# Patient Record
Sex: FEMALE | Race: WHITE | Employment: UNEMPLOYED | URBAN - METROPOLITAN AREA
[De-identification: names, ages, dates, MRNs, and addresses within clinical notes are randomized per-mention and may not be internally consistent; named-entity substitution may affect disease eponyms.]

---

## 2019-07-31 ENCOUNTER — HOSPITAL ENCOUNTER (OUTPATIENT)
Age: 39
Setting detail: SPECIMEN
Discharge: HOME | End: 2019-07-31
Payer: MEDICARE

## 2019-07-31 LAB
-: ABNORMAL
AMORPHOUS: ABNORMAL
BACTERIA: ABNORMAL
BILIRUBIN URINE: NEGATIVE
CASTS UA: ABNORMAL /LPF (ref 0–8)
COLOR: YELLOW
COMMENT UA: ABNORMAL
CRYSTALS, UA: ABNORMAL /HPF
EPITHELIAL CELLS UA: ABNORMAL /HPF (ref 0–5)
GLUCOSE URINE: NEGATIVE
KETONES, URINE: NEGATIVE
LEUKOCYTE ESTERASE, URINE: ABNORMAL
MUCUS: ABNORMAL
NITRITE, URINE: NEGATIVE
OTHER OBSERVATIONS UA: ABNORMAL
PH UA: 5.5 (ref 5–8)
PROTEIN UA: NEGATIVE
RBC UA: ABNORMAL /HPF (ref 0–4)
RENAL EPITHELIAL, UA: ABNORMAL /HPF
SPECIFIC GRAVITY UA: 1.02 (ref 1–1.03)
TRICHOMONAS: ABNORMAL
TURBIDITY: CLEAR
URINE HGB: ABNORMAL
UROBILINOGEN, URINE: NORMAL
WBC UA: ABNORMAL /HPF (ref 0–5)
YEAST: ABNORMAL

## 2019-08-01 LAB
FOLLICLE STIMULATING HORMONE: 7.1 U/L (ref 1.7–21.5)
LH: 4.6 U/L (ref 1–95.6)
TSH SERPL DL<=0.05 MIU/L-ACNC: 2.22 MIU/L (ref 0.3–5)

## 2019-08-02 LAB
CULTURE: ABNORMAL
Lab: ABNORMAL
SPECIMEN DESCRIPTION: ABNORMAL

## 2019-08-07 LAB
ESTRADIOL LEVEL: 38.3 PG/ML
ESTROGEN TOTAL: 87.9 PG/ML
ESTRONE: 49.6 PG/ML

## 2019-12-16 ENCOUNTER — HOSPITAL ENCOUNTER (OUTPATIENT)
Age: 39
Setting detail: SPECIMEN
Discharge: HOME | End: 2019-12-16
Payer: MEDICARE

## 2019-12-19 LAB
CULTURE: NORMAL
Lab: NORMAL
SPECIMEN DESCRIPTION: NORMAL

## 2019-12-31 ENCOUNTER — HOSPITAL ENCOUNTER (OUTPATIENT)
Age: 39
Setting detail: SPECIMEN
Discharge: HOME | End: 2019-12-31
Payer: MEDICARE

## 2019-12-31 LAB
ABSOLUTE EOS #: 0.13 K/UL (ref 0–0.44)
ABSOLUTE IMMATURE GRANULOCYTE: <0.03 K/UL (ref 0–0.3)
ABSOLUTE LYMPH #: 1.7 K/UL (ref 1.1–3.7)
ABSOLUTE MONO #: 0.42 K/UL (ref 0.1–1.2)
ALBUMIN SERPL-MCNC: 4.1 G/DL (ref 3.5–5.2)
ALBUMIN/GLOBULIN RATIO: 1.4 (ref 1–2.5)
ALP BLD-CCNC: 42 U/L (ref 35–104)
ALT SERPL-CCNC: 18 U/L (ref 5–33)
ANION GAP SERPL CALCULATED.3IONS-SCNC: 11 MMOL/L (ref 9–17)
AST SERPL-CCNC: 14 U/L
BASOPHILS # BLD: 1 % (ref 0–2)
BASOPHILS ABSOLUTE: 0.06 K/UL (ref 0–0.2)
BILIRUB SERPL-MCNC: 0.4 MG/DL (ref 0.3–1.2)
BUN BLDV-MCNC: 9 MG/DL (ref 6–20)
BUN/CREAT BLD: NORMAL (ref 9–20)
CALCIUM SERPL-MCNC: 9 MG/DL (ref 8.6–10.4)
CHLORIDE BLD-SCNC: 104 MMOL/L (ref 98–107)
CO2: 22 MMOL/L (ref 20–31)
CREAT SERPL-MCNC: 0.66 MG/DL (ref 0.5–0.9)
DIFFERENTIAL TYPE: NORMAL
EOSINOPHILS RELATIVE PERCENT: 3 % (ref 1–4)
GFR AFRICAN AMERICAN: >60 ML/MIN
GFR NON-AFRICAN AMERICAN: >60 ML/MIN
GFR SERPL CREATININE-BSD FRML MDRD: NORMAL ML/MIN/{1.73_M2}
GFR SERPL CREATININE-BSD FRML MDRD: NORMAL ML/MIN/{1.73_M2}
GLUCOSE BLD-MCNC: 84 MG/DL (ref 70–99)
HCT VFR BLD CALC: 42.8 % (ref 36.3–47.1)
HEMOGLOBIN: 13.6 G/DL (ref 11.9–15.1)
IMMATURE GRANULOCYTES: 0 %
LYMPHOCYTES # BLD: 34 % (ref 24–43)
MCH RBC QN AUTO: 28.4 PG (ref 25.2–33.5)
MCHC RBC AUTO-ENTMCNC: 31.8 G/DL (ref 28.4–34.8)
MCV RBC AUTO: 89.4 FL (ref 82.6–102.9)
MONOCYTES # BLD: 8 % (ref 3–12)
MONONUCLEOSIS SCREEN: NEGATIVE
NRBC AUTOMATED: 0 PER 100 WBC
PDW BLD-RTO: 12.5 % (ref 11.8–14.4)
PLATELET # BLD: 305 K/UL (ref 138–453)
PLATELET ESTIMATE: NORMAL
PMV BLD AUTO: 10.3 FL (ref 8.1–13.5)
POTASSIUM SERPL-SCNC: 4.3 MMOL/L (ref 3.7–5.3)
RBC # BLD: 4.79 M/UL (ref 3.95–5.11)
RBC # BLD: NORMAL 10*6/UL
SEG NEUTROPHILS: 54 % (ref 36–65)
SEGMENTED NEUTROPHILS ABSOLUTE COUNT: 2.74 K/UL (ref 1.5–8.1)
SODIUM BLD-SCNC: 137 MMOL/L (ref 135–144)
TOTAL PROTEIN: 7 G/DL (ref 6.4–8.3)
TSH SERPL DL<=0.05 MIU/L-ACNC: 1.67 MIU/L (ref 0.3–5)
WBC # BLD: 5.1 K/UL (ref 3.5–11.3)
WBC # BLD: NORMAL 10*3/UL

## 2020-01-03 LAB
EBV EARLY ANTIGEN IGG: 214 U/ML
EBV INTERPRETATION: ABNORMAL
EBV NUCLEAR AG AB: 760 U/ML
EPSTEIN-BARR VCA IGG: 1433 U/ML
EPSTEIN-BARR VCA IGM: 67 U/ML

## 2020-01-04 LAB
CMV DNA QUANTATATIVE INTERPRETATION: NOT DETECTED
CMV QUANT IU/ML: <227 IU/ML
CMV QUANT LOG IU/ML: <2.4 LOG IU/ML
CMV SOURCE: NORMAL
CMVQ COPY/ML: <390 CPY/ML
CYTOMEGALOVIRUS QUANT. PCR: <2.6 LOG CPY/ML

## 2020-07-27 ENCOUNTER — HOSPITAL ENCOUNTER (OUTPATIENT)
Age: 40
Setting detail: SPECIMEN
Discharge: HOME | End: 2020-07-27
Payer: MEDICARE

## 2020-07-28 LAB
CULTURE: NO GROWTH
Lab: NORMAL
SPECIMEN DESCRIPTION: NORMAL

## 2021-01-07 ENCOUNTER — HOSPITAL ENCOUNTER (OUTPATIENT)
Age: 41
Setting detail: SPECIMEN
Discharge: HOME | End: 2021-01-07
Payer: COMMERCIAL

## 2021-01-09 LAB
CULTURE: ABNORMAL
Lab: ABNORMAL
SPECIMEN DESCRIPTION: ABNORMAL

## 2021-01-13 ENCOUNTER — HOSPITAL ENCOUNTER (OUTPATIENT)
Age: 41
Setting detail: SPECIMEN
Discharge: HOME | End: 2021-01-13
Payer: COMMERCIAL

## 2021-01-13 LAB
ABSOLUTE EOS #: 0.21 K/UL (ref 0–0.44)
ABSOLUTE IMMATURE GRANULOCYTE: <0.03 K/UL (ref 0–0.3)
ABSOLUTE LYMPH #: 2.08 K/UL (ref 1.1–3.7)
ABSOLUTE MONO #: 0.64 K/UL (ref 0.1–1.2)
BASOPHILS # BLD: 1 % (ref 0–2)
BASOPHILS ABSOLUTE: 0.08 K/UL (ref 0–0.2)
DIFFERENTIAL TYPE: NORMAL
EOSINOPHILS RELATIVE PERCENT: 3 % (ref 1–4)
HCT VFR BLD CALC: 42.8 % (ref 36.3–47.1)
HEMOGLOBIN: 13.7 G/DL (ref 11.9–15.1)
IMMATURE GRANULOCYTES: 0 %
LYMPHOCYTES # BLD: 30 % (ref 24–43)
MCH RBC QN AUTO: 28.8 PG (ref 25.2–33.5)
MCHC RBC AUTO-ENTMCNC: 32 G/DL (ref 28.4–34.8)
MCV RBC AUTO: 89.9 FL (ref 82.6–102.9)
MONOCYTES # BLD: 9 % (ref 3–12)
NRBC AUTOMATED: 0 PER 100 WBC
PDW BLD-RTO: 12.8 % (ref 11.8–14.4)
PLATELET # BLD: 378 K/UL (ref 138–453)
PLATELET ESTIMATE: NORMAL
PMV BLD AUTO: 10.1 FL (ref 8.1–13.5)
RBC # BLD: 4.76 M/UL (ref 3.95–5.11)
RBC # BLD: NORMAL 10*6/UL
SEG NEUTROPHILS: 57 % (ref 36–65)
SEGMENTED NEUTROPHILS ABSOLUTE COUNT: 3.88 K/UL (ref 1.5–8.1)
WBC # BLD: 6.9 K/UL (ref 3.5–11.3)
WBC # BLD: NORMAL 10*3/UL

## 2021-01-14 LAB
ALBUMIN SERPL-MCNC: 4.3 G/DL (ref 3.5–5.2)
ALBUMIN/GLOBULIN RATIO: 1.5 (ref 1–2.5)
ALP BLD-CCNC: 50 U/L (ref 35–104)
ALT SERPL-CCNC: 19 U/L (ref 5–33)
ANION GAP SERPL CALCULATED.3IONS-SCNC: 7 MMOL/L (ref 9–17)
AST SERPL-CCNC: 16 U/L
BILIRUB SERPL-MCNC: 0.19 MG/DL (ref 0.3–1.2)
BUN BLDV-MCNC: 13 MG/DL (ref 6–20)
BUN/CREAT BLD: ABNORMAL (ref 9–20)
CALCIUM SERPL-MCNC: 9.9 MG/DL (ref 8.6–10.4)
CHLORIDE BLD-SCNC: 99 MMOL/L (ref 98–107)
CHOLESTEROL/HDL RATIO: 3.7
CHOLESTEROL: 191 MG/DL
CO2: 29 MMOL/L (ref 20–31)
CREAT SERPL-MCNC: 0.67 MG/DL (ref 0.5–0.9)
GFR AFRICAN AMERICAN: >60 ML/MIN
GFR NON-AFRICAN AMERICAN: >60 ML/MIN
GFR SERPL CREATININE-BSD FRML MDRD: ABNORMAL ML/MIN/{1.73_M2}
GFR SERPL CREATININE-BSD FRML MDRD: ABNORMAL ML/MIN/{1.73_M2}
GLUCOSE BLD-MCNC: 80 MG/DL (ref 70–99)
HDLC SERPL-MCNC: 51 MG/DL
LDL CHOLESTEROL: 119 MG/DL (ref 0–130)
POTASSIUM SERPL-SCNC: 4 MMOL/L (ref 3.7–5.3)
SODIUM BLD-SCNC: 135 MMOL/L (ref 135–144)
TOTAL PROTEIN: 7.2 G/DL (ref 6.4–8.3)
TRIGL SERPL-MCNC: 106 MG/DL
VITAMIN D 25-HYDROXY: 18.4 NG/ML (ref 30–100)
VLDLC SERPL CALC-MCNC: NORMAL MG/DL (ref 1–30)

## 2021-01-27 ENCOUNTER — HOSPITAL ENCOUNTER (OUTPATIENT)
Age: 41
Setting detail: SPECIMEN
Discharge: HOME | End: 2021-01-27
Payer: COMMERCIAL

## 2021-01-27 LAB — MONONUCLEOSIS SCREEN: NEGATIVE

## 2022-07-24 ENCOUNTER — HOSPITAL ENCOUNTER (INPATIENT)
Age: 42
LOS: 2 days | Discharge: HOME | DRG: 751 | End: 2022-07-26
Attending: STUDENT IN AN ORGANIZED HEALTH CARE EDUCATION/TRAINING PROGRAM | Admitting: PSYCHIATRY & NEUROLOGY
Payer: MEDICAID

## 2022-07-24 DIAGNOSIS — F32.A DEPRESSION WITH SUICIDAL IDEATION: ICD-10-CM

## 2022-07-24 DIAGNOSIS — R45.851 SUICIDAL IDEATION: Primary | ICD-10-CM

## 2022-07-24 DIAGNOSIS — R45.851 DEPRESSION WITH SUICIDAL IDEATION: ICD-10-CM

## 2022-07-24 PROBLEM — F33.2 MDD (MAJOR DEPRESSIVE DISORDER), RECURRENT SEVERE, WITHOUT PSYCHOSIS (HCC): Status: ACTIVE | Noted: 2022-07-24

## 2022-07-24 LAB
ALBUMIN SERPL-MCNC: 4.5 G/DL (ref 3.5–5.1)
ALP BLD-CCNC: 48 U/L (ref 38–126)
ALT SERPL-CCNC: 24 U/L (ref 11–66)
AMPHETAMINE+METHAMPHETAMINE URINE SCREEN: NEGATIVE
ANION GAP SERPL CALCULATED.3IONS-SCNC: 12 MEQ/L (ref 8–16)
AST SERPL-CCNC: 21 U/L (ref 5–40)
BACTERIA: ABNORMAL
BARBITURATE QUANTITATIVE URINE: NEGATIVE
BASOPHILS # BLD: 1 %
BASOPHILS ABSOLUTE: 0.1 THOU/MM3 (ref 0–0.1)
BENZODIAZEPINE QUANTITATIVE URINE: NEGATIVE
BILIRUB SERPL-MCNC: 0.6 MG/DL (ref 0.3–1.2)
BILIRUBIN DIRECT: < 0.2 MG/DL (ref 0–0.3)
BILIRUBIN URINE: NEGATIVE
BLOOD, URINE: NEGATIVE
BUN BLDV-MCNC: 13 MG/DL (ref 7–22)
CALCIUM SERPL-MCNC: 9.8 MG/DL (ref 8.5–10.5)
CANNABINOID QUANTITATIVE URINE: NEGATIVE
CASTS: ABNORMAL /LPF
CASTS: ABNORMAL /LPF
CHARACTER, URINE: ABNORMAL
CHLORIDE BLD-SCNC: 104 MEQ/L (ref 98–111)
CO2: 23 MEQ/L (ref 23–33)
COCAINE METABOLITE QUANTITATIVE URINE: NEGATIVE
COLOR: YELLOW
CREAT SERPL-MCNC: 0.8 MG/DL (ref 0.4–1.2)
CRYSTALS: ABNORMAL
EOSINOPHIL # BLD: 2.2 %
EOSINOPHILS ABSOLUTE: 0.2 THOU/MM3 (ref 0–0.4)
EPITHELIAL CELLS, UA: ABNORMAL /HPF
ERYTHROCYTE [DISTWIDTH] IN BLOOD BY AUTOMATED COUNT: 13 % (ref 11.5–14.5)
ERYTHROCYTE [DISTWIDTH] IN BLOOD BY AUTOMATED COUNT: 42.6 FL (ref 35–45)
GFR SERPL CREATININE-BSD FRML MDRD: 79 ML/MIN/1.73M2
GLUCOSE BLD-MCNC: 98 MG/DL (ref 70–108)
GLUCOSE, URINE: NEGATIVE MG/DL
HCT VFR BLD CALC: 44.5 % (ref 37–47)
HEMOGLOBIN: 14.7 GM/DL (ref 12–16)
IMMATURE GRANS (ABS): 0.03 THOU/MM3 (ref 0–0.07)
IMMATURE GRANULOCYTES: 0.4 %
KETONES, URINE: ABNORMAL
LACTIC ACID: 0.8 MMOL/L (ref 0.5–2)
LEUKOCYTE ESTERASE, URINE: ABNORMAL
LYMPHOCYTES # BLD: 25.5 %
LYMPHOCYTES ABSOLUTE: 1.8 THOU/MM3 (ref 1–4.8)
MCH RBC QN AUTO: 29.8 PG (ref 26–33)
MCHC RBC AUTO-ENTMCNC: 33 GM/DL (ref 32.2–35.5)
MCV RBC AUTO: 90.1 FL (ref 81–99)
MISCELLANEOUS LAB TEST RESULT: ABNORMAL
MONOCYTES # BLD: 7.6 %
MONOCYTES ABSOLUTE: 0.5 THOU/MM3 (ref 0.4–1.3)
NITRITE, URINE: NEGATIVE
NUCLEATED RED BLOOD CELLS: 0 /100 WBC
OPIATES, URINE: NEGATIVE
OSMOLALITY CALCULATION: 277.6 MOSMOL/KG (ref 275–300)
OXYCODONE: NEGATIVE
PH UA: 6.5 (ref 5–9)
PHENCYCLIDINE QUANTITATIVE URINE: NEGATIVE
PLATELET # BLD: 350 THOU/MM3 (ref 130–400)
PMV BLD AUTO: 9.4 FL (ref 9.4–12.4)
POTASSIUM REFLEX MAGNESIUM: 4.2 MEQ/L (ref 3.5–5.2)
PREGNANCY, SERUM: NEGATIVE
PROTEIN UA: ABNORMAL MG/DL
RBC # BLD: 4.94 MILL/MM3 (ref 4.2–5.4)
RBC URINE: ABNORMAL /HPF
RENAL EPITHELIAL, UA: ABNORMAL
SEG NEUTROPHILS: 63.3 %
SEGMENTED NEUTROPHILS ABSOLUTE COUNT: 4.5 THOU/MM3 (ref 1.8–7.7)
SODIUM BLD-SCNC: 139 MEQ/L (ref 135–145)
SPECIFIC GRAVITY UA: 1.02 (ref 1–1.03)
TOTAL CK: 106 U/L (ref 30–135)
TOTAL PROTEIN: 7.7 G/DL (ref 6.1–8)
UROBILINOGEN, URINE: 1 EU/DL (ref 0–1)
WBC # BLD: 7.1 THOU/MM3 (ref 4.8–10.8)
WBC UA: ABNORMAL /HPF
YEAST: ABNORMAL

## 2022-07-24 PROCEDURE — 36415 COLL VENOUS BLD VENIPUNCTURE: CPT

## 2022-07-24 PROCEDURE — 99285 EMERGENCY DEPT VISIT HI MDM: CPT

## 2022-07-24 PROCEDURE — 84703 CHORIONIC GONADOTROPIN ASSAY: CPT

## 2022-07-24 PROCEDURE — 80048 BASIC METABOLIC PNL TOTAL CA: CPT

## 2022-07-24 PROCEDURE — 6370000000 HC RX 637 (ALT 250 FOR IP): Performed by: PSYCHIATRY & NEUROLOGY

## 2022-07-24 PROCEDURE — 83605 ASSAY OF LACTIC ACID: CPT

## 2022-07-24 PROCEDURE — 82550 ASSAY OF CK (CPK): CPT

## 2022-07-24 PROCEDURE — 85025 COMPLETE CBC W/AUTO DIFF WBC: CPT

## 2022-07-24 PROCEDURE — 90792 PSYCH DIAG EVAL W/MED SRVCS: CPT | Performed by: PSYCHIATRY & NEUROLOGY

## 2022-07-24 PROCEDURE — 80307 DRUG TEST PRSMV CHEM ANLYZR: CPT

## 2022-07-24 PROCEDURE — 81001 URINALYSIS AUTO W/SCOPE: CPT

## 2022-07-24 PROCEDURE — 1240000000 HC EMOTIONAL WELLNESS R&B

## 2022-07-24 PROCEDURE — 80076 HEPATIC FUNCTION PANEL: CPT

## 2022-07-24 RX ORDER — BUSPIRONE HYDROCHLORIDE 10 MG/1
10 TABLET ORAL NIGHTLY
COMMUNITY

## 2022-07-24 RX ORDER — DULOXETIN HYDROCHLORIDE 60 MG/1
60 CAPSULE, DELAYED RELEASE ORAL DAILY
Status: DISCONTINUED | OUTPATIENT
Start: 2022-07-24 | End: 2022-07-26 | Stop reason: HOSPADM

## 2022-07-24 RX ORDER — HYDROCHLOROTHIAZIDE 12.5 MG/1
12.5 TABLET ORAL DAILY
COMMUNITY

## 2022-07-24 RX ORDER — CEPHALEXIN 250 MG/1
500 CAPSULE ORAL ONCE
Status: DISCONTINUED | OUTPATIENT
Start: 2022-07-24 | End: 2022-07-26 | Stop reason: HOSPADM

## 2022-07-24 RX ORDER — BUSPIRONE HYDROCHLORIDE 10 MG/1
10 TABLET ORAL 3 TIMES DAILY
Status: DISCONTINUED | OUTPATIENT
Start: 2022-07-24 | End: 2022-07-26 | Stop reason: HOSPADM

## 2022-07-24 RX ORDER — ACETAMINOPHEN 325 MG/1
650 TABLET ORAL EVERY 4 HOURS PRN
Status: DISCONTINUED | OUTPATIENT
Start: 2022-07-24 | End: 2022-07-26 | Stop reason: HOSPADM

## 2022-07-24 RX ORDER — MAGNESIUM HYDROXIDE/ALUMINUM HYDROXICE/SIMETHICONE 120; 1200; 1200 MG/30ML; MG/30ML; MG/30ML
30 SUSPENSION ORAL EVERY 6 HOURS PRN
Status: DISCONTINUED | OUTPATIENT
Start: 2022-07-24 | End: 2022-07-26 | Stop reason: HOSPADM

## 2022-07-24 RX ORDER — DULOXETIN HYDROCHLORIDE 60 MG/1
60 CAPSULE, DELAYED RELEASE ORAL DAILY
COMMUNITY

## 2022-07-24 RX ORDER — PROPRANOLOL HCL 60 MG
60 CAPSULE, EXTENDED RELEASE 24HR ORAL DAILY
COMMUNITY

## 2022-07-24 RX ORDER — TRAZODONE HYDROCHLORIDE 50 MG/1
50 TABLET ORAL NIGHTLY PRN
Status: DISCONTINUED | OUTPATIENT
Start: 2022-07-24 | End: 2022-07-26 | Stop reason: HOSPADM

## 2022-07-24 RX ORDER — PROPRANOLOL HCL 60 MG
60 CAPSULE, EXTENDED RELEASE 24HR ORAL DAILY
Status: DISCONTINUED | OUTPATIENT
Start: 2022-07-25 | End: 2022-07-26 | Stop reason: HOSPADM

## 2022-07-24 RX ORDER — TRAZODONE HYDROCHLORIDE 100 MG/1
100 TABLET ORAL NIGHTLY
Status: ON HOLD | COMMUNITY
End: 2022-07-26 | Stop reason: SDUPTHER

## 2022-07-24 RX ORDER — TRAZODONE HYDROCHLORIDE 100 MG/1
100 TABLET ORAL NIGHTLY
Status: DISCONTINUED | OUTPATIENT
Start: 2022-07-24 | End: 2022-07-26 | Stop reason: HOSPADM

## 2022-07-24 RX ORDER — HYDROXYZINE HYDROCHLORIDE 25 MG/1
50 TABLET, FILM COATED ORAL 3 TIMES DAILY PRN
Status: DISCONTINUED | OUTPATIENT
Start: 2022-07-24 | End: 2022-07-26 | Stop reason: HOSPADM

## 2022-07-24 RX ORDER — IBUPROFEN 200 MG
400 TABLET ORAL EVERY 6 HOURS PRN
Status: DISCONTINUED | OUTPATIENT
Start: 2022-07-24 | End: 2022-07-26 | Stop reason: HOSPADM

## 2022-07-24 RX ORDER — HYDROCHLOROTHIAZIDE 25 MG/1
12.5 TABLET ORAL DAILY
Status: DISCONTINUED | OUTPATIENT
Start: 2022-07-24 | End: 2022-07-26 | Stop reason: HOSPADM

## 2022-07-24 RX ADMIN — BUSPIRONE HYDROCHLORIDE 10 MG: 10 TABLET ORAL at 21:38

## 2022-07-24 RX ADMIN — HYDROXYZINE HYDROCHLORIDE 50 MG: 25 TABLET, FILM COATED ORAL at 21:38

## 2022-07-24 RX ADMIN — HYDROXYZINE HYDROCHLORIDE 50 MG: 25 TABLET, FILM COATED ORAL at 15:05

## 2022-07-24 RX ADMIN — TRAZODONE HYDROCHLORIDE 100 MG: 100 TABLET ORAL at 21:38

## 2022-07-24 RX ADMIN — HYDROCHLOROTHIAZIDE 12.5 MG: 25 TABLET ORAL at 21:38

## 2022-07-24 RX ADMIN — DULOXETINE HYDROCHLORIDE 60 MG: 60 CAPSULE, DELAYED RELEASE ORAL at 21:38

## 2022-07-24 ASSESSMENT — ENCOUNTER SYMPTOMS
VOICE CHANGE: 0
STRIDOR: 0
BACK PAIN: 0
VOMITING: 0
TROUBLE SWALLOWING: 0
WHEEZING: 0
CHEST TIGHTNESS: 0
COLOR CHANGE: 0
SORE THROAT: 0
DIARRHEA: 0
NAUSEA: 0
PHOTOPHOBIA: 0
COUGH: 0
ABDOMINAL PAIN: 0
SHORTNESS OF BREATH: 0

## 2022-07-24 ASSESSMENT — SLEEP AND FATIGUE QUESTIONNAIRES
DO YOU HAVE DIFFICULTY SLEEPING: YES
SLEEP PATTERN: DISTURBED/INTERRUPTED SLEEP
DO YOU HAVE DIFFICULTY SLEEPING: YES
AVERAGE NUMBER OF SLEEP HOURS: 5
DO YOU USE A SLEEP AID: YES
SLEEP PATTERN: DISTURBED/INTERRUPTED SLEEP
DO YOU USE A SLEEP AID: YES

## 2022-07-24 ASSESSMENT — LIFESTYLE VARIABLES
HOW MANY STANDARD DRINKS CONTAINING ALCOHOL DO YOU HAVE ON A TYPICAL DAY: PATIENT DOES NOT DRINK
HOW OFTEN DO YOU HAVE A DRINK CONTAINING ALCOHOL: NEVER
HOW OFTEN DO YOU HAVE A DRINK CONTAINING ALCOHOL: NEVER
HOW MANY STANDARD DRINKS CONTAINING ALCOHOL DO YOU HAVE ON A TYPICAL DAY: PATIENT DOES NOT DRINK

## 2022-07-24 ASSESSMENT — PATIENT HEALTH QUESTIONNAIRE - PHQ9
SUM OF ALL RESPONSES TO PHQ QUESTIONS 1-9: 9
SUM OF ALL RESPONSES TO PHQ QUESTIONS 1-9: 25

## 2022-07-24 ASSESSMENT — PAIN SCALES - GENERAL: PAINLEVEL_OUTOF10: 0

## 2022-07-24 NOTE — ED NOTES
Pt appears to be resting on cot. No distress noted. Respirations even and unlabored. Call light in reach. Pt remains in safe rooms under 24/7 video surveillance for patient safety. Will continue to monitor.       Mercedes Rosen RN  07/24/22 1098

## 2022-07-24 NOTE — H&P
capsule, Take 60 mg by mouth in the morning. Allergies:  Patient has no known allergies. Family History:   No family history on file. Psychiatric Family History  Patient is uncertain of psychiatric family history    REVIEW OF SYSTEMS:  CONSTITUTIONAL:  negative  EYES:  negative  HEENT:  negative  RESPIRATORY:  negative  CARDIOVASCULAR:  negative  GASTROINTESTINAL:  negative  GENITOURINARY:  negative  INTEGUMENT/BREAST:  negative  HEMATOLOGIC/LYMPHATIC:  negative    PHYSICAL EXAM:    Vitals:  BP (!) 135/109   Pulse 95   Temp 97.7 °F (36.5 °C) (Tympanic)   Resp 18   Ht 5' 8\" (1.727 m)   Wt 192 lb (87.1 kg)   LMP 07/10/2022 (Exact Date)   SpO2 98%   BMI 29.19 kg/m²     Mental Status Examination:  Level of consciousness:  within normal limits  Appearance:  hospital attire  Behavior/Motor:  psychomotor retardation  Attitude toward examiner:  cooperative and attentive  Speech:  slow  Mood:  depressed  Affect:  mood congruent  Thought processes:  linear and goal directed  Thought content:  Homocidal ideation denies  Suicidal Ideation:  active  Delusions:  no evidence of delusions  Perceptual Disturbance:  denies any perceptual disturbance  Cognition:  oriented to person, place, and time  Concentration succeeded  Memory intact  Insight:  poor  Judgment:  poor    Cranial Nerve Exam II-XII intact        DSM-IV DIAGNOSIS:    Impression    (Axis I): Major depressive disorder; severe and without psychotic features  Panic Disorder        Disposition:    Patient to be admitted to the hospital.    Reason for Admission to Psychiatric Unit:  Threat to self requiring 24 hour professional observation    The patient requires intensive 24 level of care for the following reasons:  the need for patient safety    Estimated length of stay:  5-7 days    INITIAL TREATMENT PLAN    Risk Management:  close watch    Medications:   Will resume and adjust medications    Psychotherapy:  participation in milieu and group    GENERAL

## 2022-07-24 NOTE — ED NOTES
Pt transported on cart in stable condition to 4365. Writer spoke with nurse prior to transport.       Jf Rosa  07/24/22 7432

## 2022-07-24 NOTE — ED PROVIDER NOTES
pallor, rash and wound. Neurological:  Negative for dizziness, tremors, seizures, syncope, facial asymmetry, speech difficulty, weakness, light-headedness, numbness and headaches. Psychiatric/Behavioral:  Negative for agitation, confusion and suicidal ideas. PAST MEDICAL AND SURGICAL HISTORY   History reviewed. No pertinent past medical history. History reviewed. No pertinent surgical history.       MEDICATIONS     Current Facility-Administered Medications:     cephALEXin (KEFLEX) capsule 500 mg, 500 mg, Oral, Once, Agueda Chao MD    acetaminophen (TYLENOL) tablet 650 mg, 650 mg, Oral, Q4H PRN, Frank Roth MD    ibuprofen (ADVIL;MOTRIN) tablet 400 mg, 400 mg, Oral, Q6H PRN, Frank Roth MD    magnesium hydroxide (MILK OF MAGNESIA) 400 MG/5ML suspension 30 mL, 30 mL, Oral, Daily PRN, Frank Roth MD    aluminum & magnesium hydroxide-simethicone (MAALOX) 200-200-20 MG/5ML suspension 30 mL, 30 mL, Oral, Q6H PRN, Frank Roth MD    hydrOXYzine HCl (ATARAX) tablet 50 mg, 50 mg, Oral, TID PRN, Frank Roth MD, 50 mg at 07/24/22 1505    traZODone (DESYREL) tablet 50 mg, 50 mg, Oral, Nightly PRN, Frank Roth MD    busPIRone (BUSPAR) tablet 10 mg, 10 mg, Oral, TID, Frank Roth MD    DULoxetine (CYMBALTA) extended release capsule 60 mg, 60 mg, Oral, Daily, Pari Wang MD    hydroCHLOROthiazide (HYDRODIURIL) tablet 12.5 mg, 12.5 mg, Oral, Daily, Frank Roth MD    Nando Maza ON 7/25/2022] propranolol (INDERAL LA) extended release capsule 60 mg, 60 mg, Oral, Daily, Pari Wang MD    traZODone (DESYREL) tablet 100 mg, 100 mg, Oral, Nightly, Frank Roth MD      SOCIAL HISTORY     Social History     Social History Narrative    Not on file     Social History     Tobacco Use    Smoking status: Never    Smokeless tobacco: Never   Vaping Use    Vaping Use: Never used   Substance Use Topics    Alcohol use: Never    Drug use: Never         ALLERGIES   No Known Allergies      FAMILY HISTORY   No family history on file.      PREVIOUS RECORDS   Previous records reviewed: Medical history of hypertension, prediabetes, obesity. PHYSICAL EXAM     ED Triage Vitals [07/24/22 1203]   BP Temp Temp Source Heart Rate Resp SpO2 Height Weight   (!) 155/93 98 °F (36.7 °C) Oral 81 18 98 % -- --     Initial vital signs and nursing assessment reviewed and abnormal from high systolic blood pressure . Body mass index is 29.19 kg/m². Pulsoximetry is normal per my interpretation. Additional Vital Signs:  Vitals:    07/24/22 1504   BP: (!) 135/109   Pulse: 95   Resp: 18   Temp: 97.7 °F (36.5 °C)   SpO2: 98%       Physical Exam  Constitutional:       General: She is not in acute distress. Appearance: She is not ill-appearing, toxic-appearing or diaphoretic. HENT:      Head: Normocephalic and atraumatic. Right Ear: Tympanic membrane, ear canal and external ear normal.      Left Ear: Tympanic membrane, ear canal and external ear normal.      Nose: No congestion. Mouth/Throat:      Mouth: Mucous membranes are moist.      Pharynx: No oropharyngeal exudate or posterior oropharyngeal erythema. Eyes:      Extraocular Movements: Extraocular movements intact. Pupils: Pupils are equal, round, and reactive to light. Cardiovascular:      Rate and Rhythm: Normal rate and regular rhythm. Pulses: Normal pulses. Heart sounds: No murmur heard. No friction rub. No gallop. Pulmonary:      Effort: Pulmonary effort is normal. No respiratory distress. Breath sounds: Normal breath sounds. No stridor. No wheezing, rhonchi or rales. Chest:      Chest wall: No tenderness. Abdominal:      General: Abdomen is flat. There is no distension. Palpations: Abdomen is soft. Tenderness: There is no abdominal tenderness. There is no guarding or rebound. Musculoskeletal:         General: No swelling, tenderness, deformity or signs of injury. Normal range of motion. Cervical back: No rigidity or tenderness. Skin:     General: Skin is warm. Capillary Refill: Capillary refill takes less than 2 seconds. Findings: No lesion or rash. Neurological:      General: No focal deficit present. Mental Status: She is alert and oriented to person, place, and time. Sensory: No sensory deficit. Motor: No weakness. Coordination: Coordination normal.   Psychiatric:         Behavior: Behavior normal.           MEDICAL DECISION MAKING   Initial Assessment:   39year-old patient, history of hypertension, complaining of suicidal thoughts, sadness, desperation. Physical examination is remarkable for normal vital signs except for systolic blood pressure, normal cardiopulmonary and abdominal cell pain, normal neurological assessment, psychological assessment shows normal thought content and judgment however patient cried during our interview. Our differentials include but are not exclusive for depression, anxiety, panic attack, bipolar disease. Plan:   Blood work for medical clearance. Albuterol health assessment. .        ED RESULTS   Laboratory results:  Labs Reviewed   URINALYSIS WITH MICROSCOPIC - Abnormal; Notable for the following components:       Result Value    Ketones, Urine TRACE (*)     Protein, UA TRACE (*)     Leukocyte Esterase, Urine SMALL (*)     Character, Urine CLOUDY (*)     All other components within normal limits   GLOMERULAR FILTRATION RATE, ESTIMATED - Abnormal; Notable for the following components:    Est, Glom Filt Rate 79 (*)     All other components within normal limits   CBC WITH AUTO DIFFERENTIAL   BASIC METABOLIC PANEL W/ REFLEX TO MG FOR LOW K   HEPATIC FUNCTION PANEL   HCG, SERUM, QUALITATIVE   URINE DRUG SCREEN   LACTIC ACID   CK   ANION GAP   OSMOLALITY       Radiologic studies results:  No orders to display       ED Medications administered this visit:   Medications   cephALEXin (KEFLEX) capsule 500 mg (500 mg Oral Not Given 7/24/22 1441)   acetaminophen (TYLENOL) tablet 650 mg (has no administration in time range)   ibuprofen (ADVIL;MOTRIN) tablet 400 mg (has no administration in time range)   magnesium hydroxide (MILK OF MAGNESIA) 400 MG/5ML suspension 30 mL (has no administration in time range)   aluminum & magnesium hydroxide-simethicone (MAALOX) 200-200-20 MG/5ML suspension 30 mL (has no administration in time range)   hydrOXYzine HCl (ATARAX) tablet 50 mg (50 mg Oral Given 7/24/22 1505)   traZODone (DESYREL) tablet 50 mg (has no administration in time range)   busPIRone (BUSPAR) tablet 10 mg (has no administration in time range)   DULoxetine (CYMBALTA) extended release capsule 60 mg (has no administration in time range)   hydroCHLOROthiazide (HYDRODIURIL) tablet 12.5 mg (has no administration in time range)   propranolol (INDERAL LA) extended release capsule 60 mg (has no administration in time range)   traZODone (DESYREL) tablet 100 mg (has no administration in time range)         ED COURSE     ED Course as of 07/24/22 2128   Sun Jul 24, 2022 2127 CK is normal, hypertension panel is normal, BMP is normal, pregnancy test negative, lactic acid is normal, CBC is normal urinalysis show urinary tract infection, urine drug screen is negative. Prescription of oral antibiotic is done. Patient with medical clearance for being admitted due to depression/suicidal risk. Will be admitted [JR]      ED Course User Index  [JR] Ciaran Gilbert MD       Strict return precautions and follow up instructions were discussed with the patient prior to discharge, with which the patient agrees. MEDICATION CHANGES     Current Discharge Medication List            FINAL DISPOSITION     Final diagnoses:   Suicidal ideation   Depression with suicidal ideation     Condition: condition: good  Dispo: Admit to mental health unit - medically cleared for admission      This transcription was electronically signed.  Parts of this transcriptions may have been dictated by use of voice recognition software and electronically transcribed, and parts may have been transcribed with the assistance of an ED scribe. The transcription may contain errors not detected in proofreading. Please refer to my supervising physician's documentation if my documentation differs.     Electronically Signed: Rashaun Sy MD, 07/24/22, 9:28 PM        Rashaun Sy MD  Resident  07/24/22 6697

## 2022-07-24 NOTE — ED NOTES
Pt politely refused the antibiotic keflex. Pt states that she will get a yeast infection from the antibiotic and states that she does not have any UTI pains.       Daron Moore RN  07/24/22 6272

## 2022-07-24 NOTE — ED TRIAGE NOTES
Pt in through ED lobby. Since yesterday she has been having suicidal thoughts. She states yesterday \"Iwas sitting in my car, and it was really hot. I thought if I just stayed in the car I might die\". She is tearful. She states her kids were taken by CPS on Friday.

## 2022-07-24 NOTE — PROGRESS NOTES
Behavioral Health   Admission Note   Admission Type: Voluntary    Reason for Admission: \"I want to get help to stop hoarding animals, things and kids I need to figure out why have this compulsive need\"    Patient Strengths/Barriers  Strengths (Must Choose Two): Motivation level for treatment, Stable housing, Support from family, Support from friends  Barriers: Recreational/leisure/hobbies    In the past 3 Months, have you felt or has someone told you that you have a problem: Yes \"with hoarding\"    Medical Problems:   History reviewed. No pertinent past medical history. Status EXAM:  Mental Status and Behavioral Exam  Normal: No  Level of Assistance: Independent/Self  Facial Expression: Sad  Affect: Blunt  Level of Consciousness: Alert  Frequency of Checks: 4 times per hour, close  Mood:Normal: No  Mood: Anxious, Sad, Depressed, Despair  Motor Activity:Normal: Yes  Eye Contact: Fair  Observed Behavior: Cooperative, Tearful  Sexual Misconduct History: Current - no  Preception: [place] to person, [place] to time, [place] to place, [place] to situation  Attention:Normal: Yes  Thought Processes: Circumstantial  Thought Content:Normal: Yes  Depression Symptoms: Appetite change, Change in energy level, Crying, Feelings of helplessness, Feelings of worthlessness, Impaired concentration, Loss of interest  Anxiety Symptoms: Generalized, Panic attack  Zari Symptoms: No problems reported or observed. Hallucinations: None  Delusions: No  Memory:Normal: Yes  Insight and Judgment: Yes  Insight and Judgment: Poor judgment, Poor insight    Pt admitted with followings belongings:  Dental Appliances: None  Vision - Corrective Lenses: None  Hearing Aid: None  Jewelry: None  Body Piercings Removed: No  Clothing: Dress, Footwear, Pants, Socks, Undergarments, At bedside  Other Valuables: Money, Purse, [address] ($18.00 cash)     Admission order obtained Yes  Belongings and Valuables placed in locked room.  Patient's home medications were reviewed and verified by pharmacist Aurelia Shepard at [facility] in [facility]. Patient oriented to surroundings and program expectations and copy of patient rights given. Received admission packet:  Yes  Consents reviewed, signed Yes. Outcomes Questionnaire completed Yes. \"An Important Message from [employer] About Your Rights\" form reviewed, signed: N/A . Patient verbalize understanding: Yes. Patient education on precautions: YES/NO/NA: yes          Patient screened positive for suicide risk on CSSR-S (\"yes\" to question #4, 5, OR 6)  . Physician notified of risk score yes,  Orders received no . Pt verbalizes she will not hurt self while on the unit. Pt denies thoughts of suicide/self harm. 2 person skin assessment completed upon admission yes no open wounds, rashes or abrasions. Explained patients right to have family, representative or physician notified of their admission. Patient has Declined for physician to be notified. Patient has Declined for family/representative to be notified. Provided pt with [employer] handout entitled \"Quitting Smoking. \"  Reviewed handout with pt addressing dangers of smoking, developing coping skills, and providing basic information about quitting. Pt response to counseling:  declined pt is a non smoker. Admission summary: pt is a 39year old  female seeking treatment and drove into the hospital as she states \"CPS took my children last Friday and I need help\" Pt tearful in sharing how \"overwhelmed I feel\" \"I just can't do anything right\" Pt discussed she was in her car at her parent house yesterday with thoughts of ending her life by sitting in her hot car until she passed out but her Mom came out to check on her. 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 She reports she could not take \"all the changes\" and reports having a \"meltdown\" was off on FMLA for 3 months  and resigned.  She has not been working since doesn't feel \"like I'm enough\"  Pt shared she and her  have not been able to have biological children but has fostered 30 kids over 8 years. She reports her children have been removed from her home on Friday 7/22/22  which was very traumatic for her and her special needs children. She reports having [address]. She reports having support with her . She reports she has numerous cats/dogs in/out of her home and tends to be a \"hoarder\" Pt reports her children were removed due to \"dirty living conditions\"  reported by home health person. Pt states tearfully \"I need help to recognize when is enough\"     Pt signed consent forms for [employer] and her  Gabriela Gonjackie"  She called her  Lora Han inform she was being admitted.            Lashae Weir RN

## 2022-07-24 NOTE — ED NOTES
ED to inpatient nurses report    Chief Complaint   Patient presents with    Suicidal      Present to ED from home  LOC: alert and orientated to name, place, date  Vital signs   Vitals:    07/24/22 1203   BP: (!) 155/93   Pulse: 81   Resp: 18   Temp: 98 °F (36.7 °C)   TempSrc: Oral   SpO2: 98%      Oxygen Baseline 100%    Current needs required RA Bipap/Cpap No  LDAs:    Mobility: Independent  Pending ED orders: NA  Present condition: stable      C-SSRS Risk of Suicide: High Risk  Swallow Screening      Electronically signed by Daron Moore RN on 7/24/2022 at 2:37 PM       Daron Moore RN  07/24/22 2479

## 2022-07-24 NOTE — PROGRESS NOTES
Chief Complaint: Depression, Suicidal    Provisional Diagnosis:      Unspecified Depressive Disorder     Risk, Psychosocial and Contextual Factors:  Involvement with CPS- recent traumatic event. Current MH Treatment:   Denies, PCP currently. Present Suicidal Behavior:       Verbal: Denies                                                    Attempt: Denies     Access to Weapons:  denies     Current Suicide Risk: Low, Moderate or High:  High     Past Suicidal Behavior:                 Verbal: Yes    Attempt: Denies     Self-Injurious/Self-Mutilation: Pt states she has \"pulled my hair until it hurts, pick my skin. \"     Traumatic Event Within Past 2 Weeks: Yes       Current Abuse: denies     Legal: denies     Violence: denies     Protective Factors:  Patient's children     Housing: Lives with her  Tomeka Anderson     CPAP/Oxygen/Ambulation Difficulties: no      Basic Vital Signs Normal?: Check with Patients Nurse prior to [address]?: Check with Patients Nurse prior to Calling Psychiatry     Clinical Summary:       Patient is a 39year old female who presents to the ED voluntarily. Pt initially contacted  and then [facility] informing staff that [address] took her children on Friday July 22nd due to the living conditions of her home. Pt started to have suicidal ideations that were increasing since the weekend. Pt reports having them yesterday. This writer contacted the "[employer] and requested a wellness check. Ultimately, pt came to the hospital voluntarily. Pt drove herself. Spoke to patient who reports CPS removed her biological children from her home on Friday and contracted a safety plan. Pt reports she has three biological children and four foster children. Pt reports the CPS  ordered her biological children to be removed from the home but the foster children were allowed to stay.  Pt reports the whole ordeal was very traumatic and very lengthy. Pt very tearful and anxious. Pt reports she informed their  to take the foster children as well due to the fact that they were allowed to stay in her home but her biological children were not. Reports suicidal ideations yesterday. When asked about a plan, pt states \"I was at my parents yesterday. I got upset. I went to the car. It was very hot out. I had the thought to sit in there until I \". When asked if she acted on this, pt shares she did sit in the car for '5 minutes' until her mother 'opened' the car door. Pt very tearful, depressed and anxious. Homicidal thoughts and/or plans denied. Hallucinations denied. No delusions noted. AOD use denied. Pt has no current mental health services at this time. Level of Care Disposition:    1320  During check-in, pt was given blanket, food, bottle of water, paper, crayon, patient phone, and new scrub top. RENATO No 106 with medical provider. Patient is medically cleared. RENATO No 106 with Dr. Connie Yang. Patient to be admitted to 4E. Call transferred. 26  Pt signed voluntary admit form. [facility] report provided to [employer] on 4E. Bed to be 65B. Updated Ezequiel SCHULZ.

## 2022-07-25 PROBLEM — R45.851 SUICIDAL IDEATION: Status: ACTIVE | Noted: 2022-07-25

## 2022-07-25 PROCEDURE — 6370000000 HC RX 637 (ALT 250 FOR IP): Performed by: PSYCHIATRY & NEUROLOGY

## 2022-07-25 PROCEDURE — 99232 SBSQ HOSP IP/OBS MODERATE 35: CPT | Performed by: PSYCHIATRY & NEUROLOGY

## 2022-07-25 PROCEDURE — APPSS30 APP SPLIT SHARED TIME 16-30 MINUTES: Performed by: PHYSICIAN ASSISTANT

## 2022-07-25 PROCEDURE — 1240000000 HC EMOTIONAL WELLNESS R&B

## 2022-07-25 PROCEDURE — 90833 PSYTX W PT W E/M 30 MIN: CPT | Performed by: PSYCHIATRY & NEUROLOGY

## 2022-07-25 RX ORDER — LOPERAMIDE HYDROCHLORIDE 2 MG/1
2 CAPSULE ORAL 4 TIMES DAILY PRN
Status: DISCONTINUED | OUTPATIENT
Start: 2022-07-25 | End: 2022-07-26 | Stop reason: HOSPADM

## 2022-07-25 RX ADMIN — ACETAMINOPHEN 650 MG: 325 TABLET ORAL at 06:10

## 2022-07-25 RX ADMIN — BUSPIRONE HYDROCHLORIDE 10 MG: 10 TABLET ORAL at 22:03

## 2022-07-25 RX ADMIN — TRAZODONE HYDROCHLORIDE 100 MG: 100 TABLET ORAL at 22:02

## 2022-07-25 RX ADMIN — DULOXETINE HYDROCHLORIDE 60 MG: 60 CAPSULE, DELAYED RELEASE ORAL at 22:03

## 2022-07-25 RX ADMIN — PROPRANOLOL HYDROCHLORIDE 60 MG: 60 CAPSULE, EXTENDED RELEASE ORAL at 22:04

## 2022-07-25 RX ADMIN — HYDROXYZINE HYDROCHLORIDE 50 MG: 25 TABLET, FILM COATED ORAL at 18:53

## 2022-07-25 RX ADMIN — HYDROCHLOROTHIAZIDE 12.5 MG: 25 TABLET ORAL at 22:03

## 2022-07-25 ASSESSMENT — PAIN DESCRIPTION - DESCRIPTORS: DESCRIPTORS: ACHING

## 2022-07-25 ASSESSMENT — PAIN SCALES - GENERAL
PAINLEVEL_OUTOF10: 0
PAINLEVEL_OUTOF10: 0
PAINLEVEL_OUTOF10: 3

## 2022-07-25 ASSESSMENT — PAIN - FUNCTIONAL ASSESSMENT: PAIN_FUNCTIONAL_ASSESSMENT: ACTIVITIES ARE NOT PREVENTED

## 2022-07-25 ASSESSMENT — PAIN DESCRIPTION - LOCATION: LOCATION: BACK

## 2022-07-25 ASSESSMENT — PAIN DESCRIPTION - ORIENTATION: ORIENTATION: OTHER (COMMENT)

## 2022-07-25 NOTE — BH NOTE
Patient was unable to attend the goal group and community meeting due to being with the  and the doctor at time of group.

## 2022-07-25 NOTE — PATIENT CARE CONFERENCE
[facility]  Initial Interdisciplinary Treatment Plan NOTE    Review Date & Time: 7/25/2022 850     Patient was in treatment team.  See Multidisciplinary Treatment Team sheet for participants. Admission Type:   Admission Type: Voluntary    Reason for admission:  Reason for Admission: \"I want to get help to stop hoarding animals, things and kids I need to figure out why have this compulsive need\"      Estimated Length of Stay Update:  3-5 days   Estimated Discharge Date Update: 2-4 days     Patient Strengths/Barriers  Strengths (Must Choose Two): Motivation level for treatment, Stable housing, Support from family, Support from friends  Barriers: Recreational/leisure/hobbies  Addictive Behavior:Addictive Behavior  In the Past 3 Months, Have You Felt or Has Someone Told You That You Have a Problem With  : None  Medical Problems:History reviewed. No pertinent past medical history. EDUCATION:   Learner Progress Toward Treatment Goals: Reviewed results and recommendations of this team, Reviewed group plan and strategies, Reviewed signs, symptoms and risk of self harm and violent behavior, and Reviewed goals and plan of care    Method: Individual    Outcome: Verbalized understanding and Demonstrated Understanding    PATIENT GOALS: Assistance in figuring out what happened Friday and how to fix it. PLAN/TREATMENT RECOMMENDATIONS UPDATE:   What is the most important thing we can help you with while you are here? See above   Who is your support system? , mom, siblings, friends   Do you have follow-up providers? No   Do you have the ability to pay for your medications? Yes   Where will you be residing when you leave the hospital? At home   Will need a return to work slip or FMLA paper completion?  No       GOALS UPDATE:   Time frame for Short-Term Goals: ongoing     [facility], [address]

## 2022-07-25 NOTE — PLAN OF CARE
Patient has attended at least 2 groups today and has also been out of her room for social interaction this shift so she has been able to demonstrate effective coping strategies.    Problem: Discharge Planning  Goal: Discharge to home or other facility with appropriate resources  7/25/2022 1210 by Ender Dias RN  Outcome: Not Progressing  Flowsheets (Taken 7/25/2022 0800)  Discharge to home or other facility with appropriate resources:   Arrange for needed discharge resources and transportation as appropriate   Identify barriers to discharge with patient and caregiver   Identify discharge learning needs (meds, wound care, etc)  7/25/2022 0121 by Kalen Leslie RN  Outcome: Progressing  Note: Patient plans to be discharged home with  but follow up unknown at this time

## 2022-07-25 NOTE — BH NOTE
Group Therapy Note    Date: 7/25/2022  Start Time:  1000  End Time:   1100  Number of Participants: 5    Type of Group:  Recreation/Coping    Patient's Goal:   Increase knowledge of positive coping skills. Increase socialization. Notes:  Patient participated in [facility] Coping Activity and shared with others in the group.      Status After Intervention:  Improved    Participation Level: Interactive    Participation Quality: Appropriate, Attentive, and Sharing      Speech:  normal      Thought Process/Content: Logical      Affective Functioning: Congruent      Mood: euthymic      Level of consciousness:  Oriented x4      Response to Learning: Progressing to goal      Endings: None Reported    Modes of Intervention: Education, Support, Socialization, Exploration, and Activity      Discipline Responsible: Psychoeducational Specialist      Signature:  Pepito Rodriguez

## 2022-07-25 NOTE — BH NOTE
INPATIENT RECREATIONAL THERAPY  ADULT BEHAVIORAL SERVICES  EVALUATION    REFERRING PHYSICIAN:  Dr. Maikel Sales  DIAGNOSIS:   Major Depressive Disorder, Recurrent Severe, Without Psychosis  PRECAUTIONS:   Standard precautions    HISTORY OF PRESENT ILLNESS/INJURY:   Patient was admitted to the unit due to suicidal ideation and depression. Patient stated that she just wants to die. Patient has stress due to having some of her children being taken from her and her  by CPS due to the living conditions in her home. Patient reported that she has been pulling her hair and picking at her skin prior to admission. Patient also has stress due to a recent move to [place] from [place]. Patient was pleasant and cooperative. PMH:  Please see medical chart for prior medical history, allergies, and medication    HISTORY OF PSYCHIATRIC TREATMENT:  OP:  PCP    DATE OF BIRTH:  [birth year]  GENDER:   female  MARITAL STATUS:  Patient reported that she is  with  5 adopted children and 4 foster children. EMPLOYMENT STATUS:  unemployed    LIVING SITUATION/SUPPORT:  Lives with her . EDUCATIONAL LEVEL:   Patient has a Bachelor's Degree in Education  MEDICATION/DRUG USE:  None reported    LEISURE INTERESTS:  reading, arts/crafts, family activities, yoga, listening to music, watching movies and TV, pet care  ACTIVITY PREFERENCE:   no preference  ACTIVITY TYPES:    Passive. Indoor. Outdoor. Active. COGNITION:     COPING:   poor  ATTENTION:  fair  RELAXATION:  Patient reported anxiety.    SELF-ESTEEM:  poor  MOTIVATION:    poor - no motivation    SOCIAL SKILLS:  good  FRUSTRATION TOLERANCE:    ATTENTION SEEKING:   none noted  COOPERATION:  cooperative and pleasant  AFFECT:   APPEARANCE:  appropriate    HEARING:    no problems noted  VISION:  no problems noted  VERBAL COMMUNICATION:    no problems noted  WRITTEN COMMUNICATION:   no problems noted    COORDINATION:  No problems noted  MOBILITY:  Ambulates independently   GOALS: Identify 2 new positive coping skills by time of discharge.

## 2022-07-25 NOTE — PROGRESS NOTES
Behavioral Services  Medicare Certification Upon Admission    I certify that this patient's inpatient psychiatric hospital admission is medically necessary for:    [x] (1) Treatment which could reasonably be expected to improve this patient's condition,       [x] (2) Or for diagnostic study;     AND     [x](2) The inpatient psychiatric services are provided while the individual is under the care of a physician and are included in the individualized plan of care.     Estimated length of stay/service 3-5 days    Plan for post-hospital care hc    Electronically signed by Parminder Crocker MD on 7/25/2022 at 9:34 AM

## 2022-07-25 NOTE — PROGRESS NOTES
Case management- Spoke with patient a few times today in regards to her situation. Patient has been asking for information on charges from [employer], asking for information for the the [facility] office to get help with .  called [employer] police asking for help in looking up her charges, they did not find any but suggested that patient can call the 's office.  looked up prosecutors office and number for [facility] for patient and gave it to her.

## 2022-07-25 NOTE — PLAN OF CARE
Problem: Self Harm/Suicidality  Goal: Will have no self-injury during hospital stay  Description: INTERVENTIONS:  1. Q 30 MINUTES: Routine safety checks  2. Q SHIFT & PRN: Assess risk to determine if routine checks are adequate to maintain patient safety  7/25/2022 1210 by Christiano Seymour RN  Outcome: Progressing  7/25/2022 0121 by Constance Mcfarlane RN  Outcome: Progressing  Note: Patient has remained safe and free of harm     Problem: Depression  Goal: Will be euthymic at discharge  Description: INTERVENTIONS:  1. Administer medication as ordered  2. Provide emotional support via 1:1 interaction with staff  3. Encourage involvement in milieu/groups/activities  4. Monitor for social isolation  7/25/2022 1210 by Christiano Seymour RN  Outcome: Progressing  7/25/2022 0121 by Constance Mcfarlane RN  Outcome: Progressing  Note: Patient is flat, sad and worried. Mood is 3/10 with high anxiety and depression     Problem: Anxiety  Goal: Will report anxiety at manageable levels  Description: INTERVENTIONS:  1. Administer medication as ordered  2. Teach and rehearse alternative coping skills  3. Provide emotional support with 1:1 interaction with staff  7/25/2022 1210 by Christiano Seymour RN  Outcome: Progressing  Flowsheets (Taken 7/25/2022 0800)  Will report anxiety at manageable levels:   Administer medication as ordered   Provide emotional support with 1:1 interaction with staff   Teach and rehearse alternative coping skills  7/25/2022 0121 by Constance Mcfarlane RN  Outcome: Progressing  Note: Patient is flat, sad and worried. Mood is 3/10 with high anxiety and depression     Problem: Sleep Disturbance  Goal: Will exhibit normal sleeping pattern  Description: INTERVENTIONS:  1. Administer medication as ordered  2. Decrease environmental stimuli, including noise, as appropriate  3.  Discourage social isolation and naps during the day  7/25/2022 1210 by Christiano Seymour RN  Outcome: Progressing  Note: 7.5 continuous  7/25/2022 0121 by Luis Ramirez Tisha Drew RN  Outcome: Progressing  Note: Patient resting quietly with no distress noted     Problem: Discharge Planning  Goal: Discharge to home or other facility with appropriate resources  7/25/2022 1210 by Minesh Lundy RN  Outcome: Not Progressing  Flowsheets (Taken 7/25/2022 0800)  Discharge to home or other facility with appropriate resources:   Arrange for needed discharge resources and transportation as appropriate   Identify barriers to discharge with patient and caregiver   Identify discharge learning needs (meds, wound care, etc)  7/25/2022 0121 by Milli Aaron RN  Outcome: Progressing  Note: Patient plans to be discharged home with  but follow up unknown at this time     Problem: Cardiovascular - Adult  Goal: Maintains optimal cardiac output and hemodynamic stability  7/25/2022 1210 by Minesh Lundy RN  Outcome: Progressing  Flowsheets (Taken 7/25/2022 0800)  Maintains optimal cardiac output and hemodynamic stability: Monitor blood pressure and heart rate  7/25/2022 0121 by Milli Aaron RN  Outcome: Progressing  Flowsheets (Taken 7/25/2022 0121)  Maintains optimal cardiac output and hemodynamic stability: Monitor blood pressure and heart rate     Problem: Discharge Planning  Goal: Discharge to home or other facility with appropriate resources  7/25/2022 1210 by Minesh Lundy RN  Outcome: Not Progressing  Flowsheets (Taken 7/25/2022 0800)  Discharge to home or other facility with appropriate resources:   Arrange for needed discharge resources and transportation as appropriate   Identify barriers to discharge with patient and caregiver   Identify discharge learning needs (meds, wound care, etc)  7/25/2022 0121 by Milli Aaron RN  Outcome: Progressing  Note: Patient plans to be discharged home with  but follow up unknown at this time

## 2022-07-25 NOTE — PLAN OF CARE
Problem: Self Harm/Suicidality  Goal: Will have no self-injury during hospital stay  Description: INTERVENTIONS:  1. Q 30 MINUTES: Routine safety checks  2. Q SHIFT & PRN: Assess risk to determine if routine checks are adequate to maintain patient safety  Outcome: Progressing  Note: Patient has remained safe and free of harm     Problem: Depression  Goal: Will be euthymic at discharge  Description: INTERVENTIONS:  1. Administer medication as ordered  2. Provide emotional support via 1:1 interaction with staff  3. Encourage involvement in milieu/groups/activities  4. Monitor for social isolation  Outcome: Progressing  Note: Patient is flat, sad and worried. Mood is 3/10 with high anxiety and depression     Problem: Anxiety  Goal: Will report anxiety at manageable levels  Description: INTERVENTIONS:  1. Administer medication as ordered  2. Teach and rehearse alternative coping skills  3. Provide emotional support with 1:1 interaction with staff  Outcome: Progressing  Note: Patient is flat, sad and worried. Mood is 3/10 with high anxiety and depression     Problem: Discharge Planning  Goal: Discharge to home or other facility with appropriate resources  Outcome: Progressing  Note: Patient plans to be discharged home with  but follow up unknown at this time     Problem: Cardiovascular - Adult  Goal: Maintains optimal cardiac output and hemodynamic stability  Outcome: Progressing  Flowsheets (Taken 7/25/2022 0121)  Maintains optimal cardiac output and hemodynamic stability: Monitor blood pressure and heart rate     Problem: Sleep Disturbance  Goal: Will exhibit normal sleeping pattern  Description: INTERVENTIONS:  1. Administer medication as ordered  2. Decrease environmental stimuli, including noise, as appropriate  3.  Discourage social isolation and naps during the day  Outcome: Progressing  Note: Patient resting quietly with no distress noted   Care plan reviewed with patient and verbalize understanding of

## 2022-07-25 NOTE — PROGRESS NOTES
Department of Psychiatry  Progress Note     Chief Complaint:  suicidal ideation    PROGRESS:  Dakota Jade is seen out on the unit. She is receptive to interaction. She reports she is doing better today. She states she has been dealing with CPS lately. She states one of the counties from where she recently moved to is threatening to remove her children from her home and charged her and her  with child endangerment. She states the county they moved from felt there was nothing wrong with their living conditions. She also reports that she has issues with collecting things. She feels that she has a hard time saying no to things. For example, she reports that she noted that she has on were on sale for $1 so she bought \"50 pairs\" of them. She mentions that buying things gives her a sense of control and allows her to plan for the future. She states that she feels that she has an addictive personality and wants to get help with this. She also mentions she has been dealing with extreme anxiety since she was a child. She says her mind will go to the worst case scenario. If her  is 5 minutes late, she will think he is dead in a ditch. She reports she was feeling sad and depressed prior to admission because of everything with CPS and her 16yo audra tzu passing away. She states today her depression is better. She is hopeful that we will be able to connect her with appropriate services and assist her with navigating the CPS situation. She denies any active suicidal thoughts at this time. She is able to contract for safety in the unit. She reports she had trouble sleeping last night. States her roommate snores. Staff reported she slept 7.5 hours continuous last night. Discussed with staff about moving her to a different room today. Her appetite has been good. She has been compliant with her medications and denies any side effects.   She has been on the unit interacting well with peers and attending Awake   appearance:  well-appearing, hospital attire, in chair, good grooming, and good hygiene  Behavior/Motor:  no abnormalities noted  Attitude toward examiner:  cooperative, attentive, and good eye contact  Speech:  spontaneous, normal rate, and normal volume  Mood: Better per patient  Affect: Reactive  Thought processes:  linear, goal directed, and coherent  Thought content:  Denies homicidal ideation  Suicidal Ideation: Denies active suicidal ideation  Delusions:  no evidence of delusions  Perceptual Disturbance:  denies any perceptual disturbance  Cognition: Patient is oriented to person, place, time and situation  Concentration: clinically adequate  Memory: intact  Insight & Judgement: fair       ASSESSMENT     MDD (major depressive disorder), recurrent severe, without psychosis ([facility] [address]   Anxiety NOS    PLAN    Patient's symptoms show some improvement today  Medication adjustments as discussed with the attending physician: Continue current medication regimen and observe today  Attempt to develop insight, psycho-education and supportive therapy conducted. Probable discharge: Tomorrow  Follow-up: To be determined outpatient, daily while on inpatient unit    Electronically signed by Beau Harley PA-C on 7/25/2022 at 12:46 PM Reviewed patient's current plan of care and vital signs with nursing staff. **This report has been created using voice recognition software. It may contain minor errors which are inherent in voice recognition technology. **                                          Psychiatry Attending Attestation     I independently saw and evaluated the patient. I reviewed the Advance Practice Provider's documentation above. Any additional comments or changes to the Advance Practice Provider's documentation are stated below otherwise agree with assessment. Patient discusses at length about the stresses that she has been dealing with with CPS.   Reports that she is taking care of 12 cats, Several dogs and 7 children including 3 foster kids 3 adopted and one respite child. Reports that she has been increasingly distressed lately. Reports some feelings of helplessness and hopelessness feeling. No suicidal thoughts are significantly better today. Has been future oriented during the conversation. More than 16 minutes of the session was spent in supportive psychotherapy. Session lasted over 30 minutes today. PLAN  Patient s symptoms are improving  We will continue same medication today and observe  Attempt to develop insight  Psycho-education conducted. Supportive Therapy conducted.   Probable discharge is tomorrow  Follow-up TBD    Electronically signed by Hussain Gutierrez MD on 7/25/22 at 3:35 PM EDT

## 2022-07-25 NOTE — PROGRESS NOTES
BH Psychosocial Assessment    Current Level of Psychosocial Functioning     Independent                                    XXX  Dependent    Minimal Assist     Comments:      Psychosocial High Risk Factors (check all that apply)    Unable to obtain meds   Chronic illness/pain    Substance abuse   Lack of Family Support   Financial stress   Isolation                                                            XXX  Inadequate Community Resources  Suicide attempt(s)  Not taking medications   Victim of crime   Developmental Delay  Unable to manage personal needs    Age 72 or older   Homeless  No transportation   Readmission within 30 days  Unemployment                                                 XXX  Traumatic Event                                               XXX    Family/Supports identified: , 2 brothers, 1 sister, mom, friends     Sexual Orientation:  heterosexual     Patient Strengths: support from family, education    Patient Barriers: not connected to services. Safety plan: ongoing    [facility]/MH history:   denies     Plan of Care:  medication management, group/individual therapies, family meetings, psycho -education, treatment team meetings to assist with stabilization    Initial Discharge Plan: Will have followup scheduled upon discharge     Clinical Summary:  Patient is 39year old female who presented to the ED voluntarily due to worsening depression and suicidal thoughts. 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 Patient shared that she has lots of support through her family.  Patient reports that the [facility] that she lives in took away her kids on Friday but the  from the [facility] that they moved from recently refused to take the foster kids they have placed out of their home as there is nothing wrong with the living conditions of her home. Patient does not have any current mental health services. Patient denies AOD issues.  Patient denies [address]

## 2022-07-25 NOTE — PROGRESS NOTES
Discharge planning- Patient will need to walk into the office at [facility] to get paperwork and schedule a intake appointment

## 2022-07-26 VITALS
HEART RATE: 83 BPM | BODY MASS INDEX: 29.1 KG/M2 | WEIGHT: 192 LBS | OXYGEN SATURATION: 100 % | TEMPERATURE: 98.6 F | SYSTOLIC BLOOD PRESSURE: 109 MMHG | DIASTOLIC BLOOD PRESSURE: 89 MMHG | RESPIRATION RATE: 18 BRPM | HEIGHT: 68 IN

## 2022-07-26 PROCEDURE — 99239 HOSP IP/OBS DSCHRG MGMT >30: CPT | Performed by: PSYCHIATRY & NEUROLOGY

## 2022-07-26 PROCEDURE — 6370000000 HC RX 637 (ALT 250 FOR IP): Performed by: PSYCHIATRY & NEUROLOGY

## 2022-07-26 RX ORDER — HYDROXYZINE 50 MG/1
50 TABLET, FILM COATED ORAL 3 TIMES DAILY PRN
Qty: 90 TABLET | Refills: 0 | Status: SHIPPED | OUTPATIENT
Start: 2022-07-26 | End: 2022-08-25

## 2022-07-26 RX ORDER — TRAZODONE HYDROCHLORIDE 100 MG/1
100 TABLET ORAL NIGHTLY
Qty: 30 TABLET | Refills: 0 | Status: SHIPPED | OUTPATIENT
Start: 2022-07-26

## 2022-07-26 ASSESSMENT — PAIN SCALES - GENERAL: PAINLEVEL_OUTOF10: 1

## 2022-07-26 ASSESSMENT — PAIN DESCRIPTION - LOCATION: LOCATION: HIP

## 2022-07-26 ASSESSMENT — PAIN DESCRIPTION - ORIENTATION: ORIENTATION: RIGHT;LEFT

## 2022-07-26 ASSESSMENT — PAIN - FUNCTIONAL ASSESSMENT: PAIN_FUNCTIONAL_ASSESSMENT: ACTIVITIES ARE NOT PREVENTED

## 2022-07-26 NOTE — BH NOTE
Start time: 0900  End time: 0930     Group Topic: Daily Goals     Group Type: Goal Group     Intervention/goal: To increase support and identify daily goals     Attendance: Pt attended the group     Affect: bright     Behavior: Pt behavior was appropriate for group    Response: Pt was able to identify a goal     Daily Goal: \"To go home and change things for the better at home.:     Progress: Pt is working toward her goal and is leaving today

## 2022-07-26 NOTE — BH NOTE
This RN has reviewed and agrees with Keisha Nance LPN's data collection and has collaborated with this LPN regarding the patient's care plan.

## 2022-07-26 NOTE — BH NOTE
Group Therapy Note    Date: 7/25/2022  Start Time: 2000  End Time:  2020      Type of Group: Wrap-Up      Patient's Goal:  talk with      Notes:  ongoing      Participation Level:  Active Listener and Interactive    Participation Quality: Appropriate      Speech:  normal      Thought Process/Content: Logical      Affective Functioning: Flat      Mood: anxious and depressed      Level of consciousness:  Alert and Oriented x4      Response to Learning: Able to verbalize current knowledge/experience, Able to retain information, and Progressing to goal          Discipline Responsible: Licensed Practical Nurse      Signature:  Merlin Smith LPN

## 2022-07-26 NOTE — DISCHARGE SUMMARY
Provider Discharge Summary     Patient ID:  Ludy Alba  [id number]  36 y.o.  1980    Admit date: 7/24/2022    Discharge date and time: 7/26/2022  1:45 PM     Admitting Physician: Mildred Walsh MD     Discharge Physician: Alea Mcpherson MD    Admission Diagnoses: Suicidal ideation [R45.851]  MDD (major depressive disorder), recurrent severe, without psychosis ([facility] [address] [F33.2]    Discharge Diagnoses: In a mild ESL and Adderall 30 MDD (major depressive disorder), recurrent severe, without psychosis ([address]     Patient Active Problem List   Diagnosis Code    MDD (major depressive disorder), recurrent severe, without psychosis ([facility] [address] F33.2    Suicidal ideation R45.851        Admission Condition: poor    Discharged Condition: stable    Indication for Admission: threat to self    History of Present Illnes (present tense wording is of findings from admission exam and are not necessarily indicative of current findings): The patient is a 39 y.o. female with significant past medical history of Depression and Anxiety who presents with worsening depression and active suicidal ideation. Reports struggling with depression all her life. It got worse after CPS got involved. She has 3 adopted kids. They took them away due to unlivable conditions at home. Endorses severe anhedonia, irritability, low energy, sleep difficulties, feeling hopeless, helpless and thoughts of self harm. Hospital Course:   Upon admission, Ludy Alba was provided a safe secure environment, introduced to unit milieu. Patient participated in groups and individual therapies. Meds were adjusted as noted below. After few days of hospital care, patient began to feel improvement. Depression lifted, thoughts to harm self ceased. Sleep improved, appetite was good. On morning rounds 7/26/2022, Ludy Alba endorses feeling ready for discharge. Patient denies suicidal or homicidal ideations, denies hallucinations or delusions.  Denies SE's from meds.  It was decided that maximum benefit from hospital care had been achieved and patient can be discharged. Consults:   none    Significant Diagnostic Studies: Routine labs and diagnostics    Treatments: Psychotropic medications, therapy with group, milieu, and 1:1 with nurses, social workers, PATALIA/CNP, and Attending physician. Discharge Medications:  Discharge Medication List as of 7/26/2022 10:49 AM        START taking these medications    Details   hydrOXYzine HCl (ATARAX) 50 MG tablet Take 1 tablet by mouth 3 times daily as needed for Anxiety, Disp-90 tablet, R-0Normal           CONTINUE these medications which have CHANGED    Details   traZODone (DESYREL) 100 MG tablet Take 1 tablet by mouth nightly, Disp-30 tablet, R-0Normal           CONTINUE these medications which have NOT CHANGED    Details   hydroCHLOROthiazide (HYDRODIURIL) 12.5 MG tablet Take 12.5 mg by mouth in the morning. Historical Med      busPIRone (BUSPAR) 10 MG tablet Take 10 mg by mouth in the morning and 10 mg at noon and 10 mg before bedtime. Take 30 min prior to bedtime. Historical Med      propranolol (INDERAL LA) 60 MG extended release capsule Take 60 mg by mouth in the morning. Takes 1 capsule every other day. Historical Med      DULoxetine (CYMBALTA) 60 MG extended release capsule Take 60 mg by mouth in the morning. Historical Med              Core Measures statement:   Not applicable    Discharge Exam:  Level of consciousness:  Within normal limits  Appearance: Street clothes, seated, with good grooming  Behavior/Motor: No abnormalities noted  Attitude toward examiner:  Cooperative, attentive, good eye contact  Speech:  spontaneous, normal rate, normal volume and well articulated  Mood:  euthymic  Affect:  Full range  Thought processes:  linear, goal directed and coherent  Thought content:  denies homicidal ideation  Suicidal Ideation:  denies suicidal ideation  Delusions:  no evidence of delusions  Perceptual Disturbance: denies any perceptual disturbance  Cognition:  Intact  Memory: age appropriate  Insight & Judgement: fair  Medication side effects: denies     Disposition: home    Patient Instructions: Activity: activity as tolerated  1. Patient instructed to take medications regularly and follow up with outpatient appointments. Follow-up as scheduled with [facility]       Signed:    Electronically signed by Luis Nguyen MD on 7/26/22 at 1:45 PM EDT    Time Spent on discharge is more than 35 minutes in the examination, evaluation, counseling and review of medications and discharge plan.

## 2022-07-26 NOTE — BH NOTE
Group Therapy Note    Date: 7/26/2022  Start Time: 1000  End Time:  1030  Number of Participants: 8    Type of Group: Recreational    Patient's Goal:  To increase socialization    Notes:  Pt participated in the [place] social game    Status After Intervention:  Improved    Participation Level:  Active Listener and Interactive    Participation Quality: Attentive and Sharing      Speech:  normal      Thought Process/Content: Logical      Affective Functioning: Congruent      Mood: euthymic      Level of consciousness:  Alert and Oriented x4      Response to Learning: Able to verbalize current knowledge/experience, Able to verbalize/acknowledge new learning, and Progressing to goal      Endings: None Reported    Modes of Intervention: Socialization and Activity      Discipline Responsible: Psychoeducational Specialist      Signature:  Vy Grief

## 2022-07-26 NOTE — PLAN OF CARE
Problem: Self Harm/Suicidality  Goal: Will have no self-injury during hospital stay  Description: INTERVENTIONS:  1. Q 30 MINUTES: Routine safety checks  2. Q SHIFT & PRN: Assess risk to determine if routine checks are adequate to maintain patient safety  7/25/2022 2259 by Karuna Gao LPN  Outcome: Progressing Towards Goal  Note: Pt denies thoughts of self-harm so far this shift, pt has had no self-harm behaviors and remains on every 15 minute safety checks. 7/25/2022 1210 by Jam Lyles RN  Outcome: Progressing Towards Goal     Problem: Depression  Goal: Will be euthymic at discharge  Description: INTERVENTIONS:  1. Administer medication as ordered  2. Provide emotional support via 1:1 interaction with staff  3. Encourage involvement in milieu/groups/activities  4. Monitor for social isolation  7/25/2022 2259 by Karuna Gao LPN  Outcome: Progressing Towards Goal  7/25/2022 1210 by Jam Lyles RN  Outcome: Progressing Towards Goal     Problem: Anxiety  Goal: Will report anxiety at manageable levels  Description: INTERVENTIONS:  1. Administer medication as ordered  2. Teach and rehearse alternative coping skills  3. Provide emotional support with 1:1 interaction with staff  7/25/2022 2259 by Karuna Gao LPN  Outcome: Progressing Towards Goal  Flowsheets (Taken 7/25/2022 0800 by Jam Lyles RN)  Will report anxiety at manageable levels:   Administer medication as ordered   Provide emotional support with 1:1 interaction with staff   Teach and rehearse alternative coping skills  Note: Prior to phone times pt denied anxiety. Pt had a phone conversation with her mother which caused an increase in her anxiety and made her tearful. Pt was unable to take her atarax at that time due to it being too soon, pt advised on when she can request more atarax if she feels it is needed. Will monitor.    7/25/2022 1210 by Jam Lyles RN  Outcome: Progressing Towards Goal  Flowsheets (Taken 7/25/2022 0800)  Will report anxiety at manageable levels:   Administer medication as ordered   Provide emotional support with 1:1 interaction with staff   Teach and rehearse alternative coping skills     Problem: Sleep Disturbance  Goal: Will exhibit normal sleeping pattern  Description: INTERVENTIONS:  1. Administer medication as ordered  2. Decrease environmental stimuli, including noise, as appropriate  3. Discourage social isolation and naps during the day  7/25/2022 2259 by Orion Hazel LPN  Outcome: Progressing Towards Goal  Note: Pt denies issues with sleeping, but after her phone call with her mother she anticipated she would have some issues with sleep, pt was given trazodone per [facility]. Will monitor for effectiveness.    7/25/2022 1210 by Justin Alejandro RN  Outcome: Progressing Towards Goal  Note: 7.5 continuous     Problem: Discharge Planning  Goal: Discharge to home or other facility with appropriate resources  7/25/2022 2259 by Orion Hazel LPN  Outcome: Not Progressing Towards Goal  Flowsheets (Taken 7/25/2022 0800 by Justin Alejandro, RN)  Discharge to home or other facility with appropriate resources:   Arrange for needed discharge resources and transportation as appropriate   Identify barriers to discharge with patient and caregiver   Identify discharge learning needs (meds, wound care, etc)  7/25/2022 1210 by Justin Alejandro RN  Outcome: Not Progressing Towards Goal  Flowsheets (Taken 7/25/2022 0800)  Discharge to home or other facility with appropriate resources:   Arrange for needed discharge resources and transportation as appropriate   Identify barriers to discharge with patient and caregiver   Identify discharge learning needs (meds, wound care, etc)     Problem: Cardiovascular - Adult  Goal: Maintains optimal cardiac output and hemodynamic stability  7/25/2022 2259 by Orion Hazel LPN  Outcome: Progressing Towards Goal  Flowsheets (Taken 7/25/2022 0800 by Justin Alejandro, JAZZ)  Maintains optimal cardiac output and hemodynamic stability: Monitor blood pressure and heart rate  7/25/2022 1210 by Graeme Estrada RN  Outcome: Progressing Towards Goal  Flowsheets (Taken 7/25/2022 0800)  Maintains optimal cardiac output and hemodynamic stability: Monitor blood pressure and heart rate     Problem: Coping  Goal: Pt/Family able to verbalize concerns and demonstrate effective coping strategies  Description: INTERVENTIONS:  1. Assist patient/family to identify coping skills, available support systems and cultural and spiritual values  2. Provide emotional support, including active listening and acknowledgement of concerns of patient and caregivers  3. Reduce environmental stimuli, as able  4. Instruct patient/family in relaxation techniques, as appropriate  5.  Assess for spiritual pain/suffering and initiate Spiritual Care, Psychosocial Clinical Specialist consults as needed  7/25/2022 2259 by Rafa Trevizo LPN  Outcome: Progressing Towards Goal  7/25/2022 1455 by Flora Elizondo  Outcome: Progressing Towards Goal     Problem: Discharge Planning  Goal: Discharge to home or other facility with appropriate resources  7/25/2022 2259 by Rafa Trevizo LPN  Outcome: Not Progressing Towards Goal  Flowsheets (Taken 7/25/2022 0800 by Graeme Estrada RN)  Discharge to home or other facility with appropriate resources:   Arrange for needed discharge resources and transportation as appropriate   Identify barriers to discharge with patient and caregiver   Identify discharge learning needs (meds, wound care, etc)  7/25/2022 1210 by Graeme Estrada RN  Outcome: Not Progressing Towards Goal  Flowsheets (Taken 7/25/2022 0800)  Discharge to home or other facility with appropriate resources:   Arrange for needed discharge resources and transportation as appropriate   Identify barriers to discharge with patient and caregiver   Identify discharge learning needs (meds, wound care, etc)    Care plan reviewed with patient does verbalize understanding of the plan of care and contribute to goal setting.

## 2022-07-26 NOTE — DISCHARGE INSTRUCTIONS
[employer] Hotline:  [phone]    Crisis phone numbers:  Afsaneh Albert, and [facility] GUARD [facility] [phone]. Luh Baird, and [facility] [address] On license of [facility] [phone]. [facility] [phone]. [address] Rob Villalobos, and Dorian [phone]. [facility]  [address] [facility], [address] [facility]  [address]  [address] [facility] PSYCHIATRIC Professional Services  [facility] [address]  [address]  [facility], [address]  [place], [address]  [address]isha [address]    [facility] EFRAIN  Ed[place] Count includes the [facility] Hospital[facility] [address]  [address] [facility] 2210 [facility], 119 [facility]  [address] [facility]  Recovery and CRONIN [facility]  [address] [facility]  [phone]    [facility]  [address] [facility] [address], [address]  [address] [facility] [address]  Don, [address] [facility] [address]  [facility]  Edin [facility] 211  [address], 1000 [facility]  Recovery and CRONIN MEDICAL [facility]  [address], [address]  [phone]    [facility] PSYCHIAT[facility]IC IN[facility]  [address], [address]  [address]  [address]   [address]  677 [facility]  [address]  [place], [address]  Debora Kell[address] 180  [address]  [facility] 163   [facility], [address] [facility] [address]  [phone]

## 2022-07-27 ENCOUNTER — TELEPHONE (OUTPATIENT)
Dept: PSYCHIATRY | Age: 42
End: 2022-07-27

## 2022-07-27 NOTE — TELEPHONE ENCOUNTER
[address] Discharge Call Back Program. Called patient. Patient reported that there were no issues with their discharge.

## 2023-06-22 ENCOUNTER — HOSPITAL ENCOUNTER (OUTPATIENT)
Age: 43
Setting detail: SPECIMEN
Discharge: HOME | End: 2023-06-22

## 2023-06-23 LAB
CANDIDA SPECIES, DNA PROBE: NEGATIVE
GARDNERELLA VAGINALIS, DNA PROBE: POSITIVE
SOURCE: ABNORMAL
TRICHOMONAS VAGINALIS DNA: NEGATIVE

## 2023-06-27 LAB
HPV I/H RISK 4 DNA CVX QL NAA+PROBE: NOT DETECTED
HPV SAMPLE: NORMAL
HPV, INTERPRETATION: NORMAL
HPV16 DNA CVX QL NAA+PROBE: NOT DETECTED
HPV18 DNA CVX QL NAA+PROBE: NOT DETECTED
SPECIMEN DESCRIPTION: NORMAL

## 2023-06-28 LAB — CYTOLOGY REPORT: NORMAL

## 2023-07-14 ENCOUNTER — HOSPITAL ENCOUNTER (OUTPATIENT)
Age: 43
Setting detail: SPECIMEN
Discharge: HOME | End: 2023-07-14

## 2023-07-16 LAB
MICROORGANISM SPEC CULT: NORMAL
SPECIMEN DESCRIPTION: NORMAL

## 2023-07-19 ENCOUNTER — HOSPITAL ENCOUNTER (OUTPATIENT)
Age: 43
Setting detail: SPECIMEN
Discharge: HOME | End: 2023-07-19

## 2023-07-20 LAB
ALBUMIN SERPL-MCNC: 4.2 G/DL (ref 3.5–5.2)
ALBUMIN/GLOB SERPL: 1.4 {RATIO} (ref 1–2.5)
ALP SERPL-CCNC: 45 U/L (ref 35–104)
ALT SERPL-CCNC: 24 U/L (ref 5–33)
ANION GAP SERPL CALCULATED.3IONS-SCNC: 15 MMOL/L (ref 9–17)
AST SERPL-CCNC: 22 U/L
BASOPHILS # BLD: 0.08 K/UL (ref 0–0.2)
BASOPHILS NFR BLD: 2 % (ref 0–2)
BILIRUB SERPL-MCNC: 0.2 MG/DL (ref 0.3–1.2)
BUN SERPL-MCNC: 10 MG/DL (ref 6–20)
CALCIUM SERPL-MCNC: 9.5 MG/DL (ref 8.6–10.4)
CANCER AG125 SERPL-ACNC: 5 U/ML
CHLORIDE SERPL-SCNC: 101 MMOL/L (ref 98–107)
CHOLEST SERPL-MCNC: 217 MG/DL
CHOLESTEROL/HDL RATIO: 5.7
CO2 SERPL-SCNC: 21 MMOL/L (ref 20–31)
CREAT SERPL-MCNC: 1 MG/DL (ref 0.5–0.9)
EOSINOPHIL # BLD: 0.22 K/UL (ref 0–0.44)
EOSINOPHILS RELATIVE PERCENT: 5 % (ref 1–4)
ERYTHROCYTE [DISTWIDTH] IN BLOOD BY AUTOMATED COUNT: 13.5 % (ref 11.8–14.4)
GFR SERPL CREATININE-BSD FRML MDRD: >60 ML/MIN/1.73M2
GLUCOSE SERPL-MCNC: 104 MG/DL (ref 70–99)
HCT VFR BLD AUTO: 41.4 % (ref 36.3–47.1)
HDLC SERPL-MCNC: 38 MG/DL
HGB BLD-MCNC: 13.1 G/DL (ref 11.9–15.1)
IMM GRANULOCYTES # BLD AUTO: <0.03 K/UL (ref 0–0.3)
IMM GRANULOCYTES NFR BLD: 0 %
LDLC SERPL CALC-MCNC: 126 MG/DL (ref 0–130)
LYMPHOCYTES NFR BLD: 1.53 K/UL (ref 1.1–3.7)
LYMPHOCYTES RELATIVE PERCENT: 32 % (ref 24–43)
MCH RBC QN AUTO: 28.9 PG (ref 25.2–33.5)
MCHC RBC AUTO-ENTMCNC: 31.6 G/DL (ref 28.4–34.8)
MCV RBC AUTO: 91.2 FL (ref 82.6–102.9)
MONOCYTES NFR BLD: 0.47 K/UL (ref 0.1–1.2)
MONOCYTES NFR BLD: 10 % (ref 3–12)
NEUTROPHILS NFR BLD: 51 % (ref 36–65)
NEUTS SEG NFR BLD: 2.53 K/UL (ref 1.5–8.1)
NRBC BLD-RTO: 0 PER 100 WBC
PLATELET # BLD AUTO: 338 K/UL (ref 138–453)
PMV BLD AUTO: 10.5 FL (ref 8.1–13.5)
POTASSIUM SERPL-SCNC: 4.2 MMOL/L (ref 3.7–5.3)
PROT SERPL-MCNC: 7.1 G/DL (ref 6.4–8.3)
RBC # BLD AUTO: 4.54 M/UL (ref 3.95–5.11)
SODIUM SERPL-SCNC: 137 MMOL/L (ref 135–144)
TRIGL SERPL-MCNC: 265 MG/DL
TSH SERPL DL<=0.05 MIU/L-ACNC: 2.38 UIU/ML (ref 0.3–5)
WBC OTHER # BLD: 4.9 K/UL (ref 3.5–11.3)

## 2024-03-21 ENCOUNTER — OFFICE VISIT (OUTPATIENT)
Dept: PSYCHIATRY | Age: 44
End: 2024-03-21
Payer: COMMERCIAL

## 2024-03-21 VITALS
HEART RATE: 73 BPM | SYSTOLIC BLOOD PRESSURE: 114 MMHG | DIASTOLIC BLOOD PRESSURE: 73 MMHG | BODY MASS INDEX: 44.41 KG/M2 | HEIGHT: 68 IN | RESPIRATION RATE: 18 BRPM | WEIGHT: 293 LBS

## 2024-03-21 DIAGNOSIS — F43.10 PTSD (POST-TRAUMATIC STRESS DISORDER): Primary | ICD-10-CM

## 2024-03-21 DIAGNOSIS — F41.1 GENERALIZED ANXIETY DISORDER: ICD-10-CM

## 2024-03-21 DIAGNOSIS — F34.1 PERSISTENT DEPRESSIVE DISORDER: ICD-10-CM

## 2024-03-21 PROCEDURE — 90792 PSYCH DIAG EVAL W/MED SRVCS: CPT | Performed by: NURSE PRACTITIONER

## 2024-03-21 PROCEDURE — 1036F TOBACCO NON-USER: CPT | Performed by: NURSE PRACTITIONER

## 2024-03-21 RX ORDER — HYDROXYZINE PAMOATE 25 MG/1
25 CAPSULE ORAL 3 TIMES DAILY PRN
Qty: 90 CAPSULE | Refills: 0 | Status: SHIPPED | OUTPATIENT
Start: 2024-03-21

## 2024-03-21 RX ORDER — HYDROXYZINE 50 MG/1
1 TABLET, FILM COATED ORAL 3 TIMES DAILY PRN
COMMUNITY
Start: 2022-07-26 | End: 2024-03-21 | Stop reason: ALTCHOICE

## 2024-03-21 RX ORDER — CETIRIZINE HYDROCHLORIDE 5 MG/1
5 TABLET ORAL DAILY
COMMUNITY

## 2024-03-21 RX ORDER — SUMATRIPTAN 25 MG/1
25 TABLET, FILM COATED ORAL
COMMUNITY
Start: 2020-03-25

## 2024-03-21 RX ORDER — TRAZODONE HYDROCHLORIDE 50 MG/1
50 TABLET ORAL NIGHTLY
COMMUNITY
Start: 2024-02-24

## 2024-03-21 RX ORDER — ALBUTEROL SULFATE 90 UG/1
2 AEROSOL, METERED RESPIRATORY (INHALATION) EVERY 6 HOURS PRN
COMMUNITY

## 2024-03-21 RX ORDER — BUSPIRONE HYDROCHLORIDE 15 MG/1
15 TABLET ORAL 2 TIMES DAILY
Qty: 60 TABLET | Refills: 1 | Status: SHIPPED | OUTPATIENT
Start: 2024-03-21

## 2024-03-21 RX ORDER — EPINEPHRINE 0.15 MG/.3ML
0.15 INJECTION INTRAMUSCULAR PRN
COMMUNITY

## 2024-03-21 RX ORDER — DULOXETIN HYDROCHLORIDE 60 MG/1
60 CAPSULE, DELAYED RELEASE ORAL DAILY
Qty: 30 CAPSULE | Refills: 1 | Status: SHIPPED | OUTPATIENT
Start: 2024-03-21

## 2024-03-21 ASSESSMENT — PATIENT HEALTH QUESTIONNAIRE - PHQ9
1. LITTLE INTEREST OR PLEASURE IN DOING THINGS: MORE THAN HALF THE DAYS
SUM OF ALL RESPONSES TO PHQ QUESTIONS 1-9: 11
SUM OF ALL RESPONSES TO PHQ QUESTIONS 1-9: 11
SUM OF ALL RESPONSES TO PHQ9 QUESTIONS 1 & 2: 4
2. FEELING DOWN, DEPRESSED OR HOPELESS: MORE THAN HALF THE DAYS
7. TROUBLE CONCENTRATING ON THINGS, SUCH AS READING THE NEWSPAPER OR WATCHING TELEVISION: SEVERAL DAYS
SUM OF ALL RESPONSES TO PHQ QUESTIONS 1-9: 11
6. FEELING BAD ABOUT YOURSELF - OR THAT YOU ARE A FAILURE OR HAVE LET YOURSELF OR YOUR FAMILY DOWN: MORE THAN HALF THE DAYS
SUM OF ALL RESPONSES TO PHQ QUESTIONS 1-9: 11
3. TROUBLE FALLING OR STAYING ASLEEP: NOT AT ALL
4. FEELING TIRED OR HAVING LITTLE ENERGY: NEARLY EVERY DAY
9. THOUGHTS THAT YOU WOULD BE BETTER OFF DEAD, OR OF HURTING YOURSELF: NOT AT ALL
10. IF YOU CHECKED OFF ANY PROBLEMS, HOW DIFFICULT HAVE THESE PROBLEMS MADE IT FOR YOU TO DO YOUR WORK, TAKE CARE OF THINGS AT HOME, OR GET ALONG WITH OTHER PEOPLE: VERY DIFFICULT
8. MOVING OR SPEAKING SO SLOWLY THAT OTHER PEOPLE COULD HAVE NOTICED. OR THE OPPOSITE, BEING SO FIGETY OR RESTLESS THAT YOU HAVE BEEN MOVING AROUND A LOT MORE THAN USUAL: NOT AT ALL
5. POOR APPETITE OR OVEREATING: SEVERAL DAYS

## 2024-03-21 ASSESSMENT — ANXIETY QUESTIONNAIRES
5. BEING SO RESTLESS THAT IT IS HARD TO SIT STILL: 1-SEVERAL DAYS
3. WORRYING TOO MUCH ABOUT DIFFERENT THINGS: 2-OVER HALF THE DAYS
GAD7 TOTAL SCORE: 15
7. FEELING AFRAID AS IF SOMETHING AWFUL MIGHT HAPPEN: 2-OVER HALF THE DAYS
4. TROUBLE RELAXING: 3-NEARLY EVERY DAY
6. BECOMING EASILY ANNOYED OR IRRITABLE: 3-NEARLY EVERY DAY
1. FEELING NERVOUS, ANXIOUS, OR ON EDGE: MORE THAN HALF THE DAYS
2. NOT BEING ABLE TO STOP OR CONTROL WORRYING: 2-OVER HALF THE DAYS

## 2024-03-21 NOTE — PROGRESS NOTES
[facility] PHYSICIANS [place] [facility]  [facility] - [facility] PSYCHIATRY  [address]  [facility] [address]  Dept: [phone]  Dept Fax: [phone]  Loc: [phone]    Visit Date: 3/21/2024    SUBJECTIVE DATA     CHIEF COMPLAINT:    Chief Complaint   Patient presents with    Mental Health Problem    New Patient       History obtained from: patient    HISTORY OF PRESENT ILLNESS:    Kerry Frey is a 43 y.o. female who presents to the office because she states her medications are not working. She states she is still sad and anxious all of the time.  She was referred here by Audra Kong.     Suicide attempts/hospital stays: got mad at her mom and dad in  (following CPS cases) and so she sat in her car in the driveway (car was turned off) and thought \"if it gets too hot and I die its okay\". On 2022 she drove herself to [facility] following these thoughts while she was in the car \"to get the therapy I need\". States she was there for 2 nights. States she has a lot of these thoughts still. She states she sometimes has thoughts \"if I ran into a pole and \" it would be okay. She denies a current plan or intent. She states she feels like she is in a black cloud. She states intermittently she \"sees the sun\" and feels better. She states she jumped out of a barn in 7th grade to try and kill herself (did this to be the center of attention - has never been since she is always taking care of everyone else; wanted someone to notice her and not be the \"one everyone can count on\"); she hit a cinder block when she jumped so the squad was called and she had a bruised tailbone - did not tell anyone what she was trying to do; it was told she \"just fell\"; her  is the only one who knows she actually tried to kill herself.       Probation/legal history: adopted kids in  (was a stay at home mom at this time); have been foster parents for 10 years at this time. Adopted 3 children

## 2024-04-30 ENCOUNTER — OFFICE VISIT (OUTPATIENT)
Dept: PSYCHIATRY | Age: 44
End: 2024-04-30
Payer: COMMERCIAL

## 2024-04-30 VITALS
WEIGHT: 293 LBS | DIASTOLIC BLOOD PRESSURE: 89 MMHG | HEART RATE: 89 BPM | OXYGEN SATURATION: 95 % | HEIGHT: 68 IN | SYSTOLIC BLOOD PRESSURE: 144 MMHG | BODY MASS INDEX: 44.41 KG/M2

## 2024-04-30 DIAGNOSIS — F34.1 PERSISTENT DEPRESSIVE DISORDER: ICD-10-CM

## 2024-04-30 DIAGNOSIS — F43.10 PTSD (POST-TRAUMATIC STRESS DISORDER): Primary | ICD-10-CM

## 2024-04-30 DIAGNOSIS — F41.1 GENERALIZED ANXIETY DISORDER: ICD-10-CM

## 2024-04-30 PROCEDURE — 99214 OFFICE O/P EST MOD 30 MIN: CPT | Performed by: NURSE PRACTITIONER

## 2024-04-30 PROCEDURE — 1036F TOBACCO NON-USER: CPT | Performed by: NURSE PRACTITIONER

## 2024-04-30 PROCEDURE — G8427 DOCREV CUR MEDS BY ELIG CLIN: HCPCS | Performed by: NURSE PRACTITIONER

## 2024-04-30 PROCEDURE — G8417 CALC BMI ABV UP PARAM F/U: HCPCS | Performed by: NURSE PRACTITIONER

## 2024-04-30 PROCEDURE — 90833 PSYTX W PT W E/M 30 MIN: CPT | Performed by: NURSE PRACTITIONER

## 2024-04-30 RX ORDER — BUSPIRONE HYDROCHLORIDE 15 MG/1
15 TABLET ORAL 2 TIMES DAILY
Qty: 60 TABLET | Refills: 1 | Status: SHIPPED | OUTPATIENT
Start: 2024-04-30

## 2024-04-30 RX ORDER — DULOXETIN HYDROCHLORIDE 30 MG/1
90 CAPSULE, DELAYED RELEASE ORAL DAILY
Qty: 90 CAPSULE | Refills: 1 | Status: SHIPPED | OUTPATIENT
Start: 2024-04-30

## 2024-04-30 ASSESSMENT — PATIENT HEALTH QUESTIONNAIRE - PHQ9
6. FEELING BAD ABOUT YOURSELF - OR THAT YOU ARE A FAILURE OR HAVE LET YOURSELF OR YOUR FAMILY DOWN: NOT AT ALL
5. POOR APPETITE OR OVEREATING: NOT AT ALL
SUM OF ALL RESPONSES TO PHQ QUESTIONS 1-9: 7
SUM OF ALL RESPONSES TO PHQ QUESTIONS 1-9: 7
4. FEELING TIRED OR HAVING LITTLE ENERGY: MORE THAN HALF THE DAYS
8. MOVING OR SPEAKING SO SLOWLY THAT OTHER PEOPLE COULD HAVE NOTICED. OR THE OPPOSITE, BEING SO FIGETY OR RESTLESS THAT YOU HAVE BEEN MOVING AROUND A LOT MORE THAN USUAL: NOT AT ALL
2. FEELING DOWN, DEPRESSED OR HOPELESS: SEVERAL DAYS
1. LITTLE INTEREST OR PLEASURE IN DOING THINGS: SEVERAL DAYS
7. TROUBLE CONCENTRATING ON THINGS, SUCH AS READING THE NEWSPAPER OR WATCHING TELEVISION: MORE THAN HALF THE DAYS
SUM OF ALL RESPONSES TO PHQ9 QUESTIONS 1 & 2: 2
9. THOUGHTS THAT YOU WOULD BE BETTER OFF DEAD, OR OF HURTING YOURSELF: SEVERAL DAYS
10. IF YOU CHECKED OFF ANY PROBLEMS, HOW DIFFICULT HAVE THESE PROBLEMS MADE IT FOR YOU TO DO YOUR WORK, TAKE CARE OF THINGS AT HOME, OR GET ALONG WITH OTHER PEOPLE: SOMEWHAT DIFFICULT
3. TROUBLE FALLING OR STAYING ASLEEP: NOT AT ALL
SUM OF ALL RESPONSES TO PHQ QUESTIONS 1-9: 7
SUM OF ALL RESPONSES TO PHQ QUESTIONS 1-9: 6

## 2024-04-30 ASSESSMENT — COLUMBIA-SUICIDE SEVERITY RATING SCALE - C-SSRS
1. WITHIN THE PAST MONTH, HAVE YOU WISHED YOU WERE DEAD OR WISHED YOU COULD GO TO SLEEP AND NOT WAKE UP?: YES
2. HAVE YOU ACTUALLY HAD ANY THOUGHTS OF KILLING YOURSELF?: NO
6. HAVE YOU EVER DONE ANYTHING, STARTED TO DO ANYTHING, OR PREPARED TO DO ANYTHING TO END YOUR LIFE?: NO

## 2024-04-30 ASSESSMENT — ANXIETY QUESTIONNAIRES
3. WORRYING TOO MUCH ABOUT DIFFERENT THINGS: SEVERAL DAYS
2. NOT BEING ABLE TO STOP OR CONTROL WORRYING: SEVERAL DAYS
4. TROUBLE RELAXING: MORE THAN HALF THE DAYS
6. BECOMING EASILY ANNOYED OR IRRITABLE: MORE THAN HALF THE DAYS
5. BEING SO RESTLESS THAT IT IS HARD TO SIT STILL: NOT AT ALL
IF YOU CHECKED OFF ANY PROBLEMS ON THIS QUESTIONNAIRE, HOW DIFFICULT HAVE THESE PROBLEMS MADE IT FOR YOU TO DO YOUR WORK, TAKE CARE OF THINGS AT HOME, OR GET ALONG WITH OTHER PEOPLE: SOMEWHAT DIFFICULT
GAD7 TOTAL SCORE: 7
1. FEELING NERVOUS, ANXIOUS, OR ON EDGE: SEVERAL DAYS
7. FEELING AFRAID AS IF SOMETHING AWFUL MIGHT HAPPEN: NOT AT ALL

## 2024-04-30 NOTE — PROGRESS NOTES
the past 2 weeks, how often have you been bothered by any of the following problems?  Trouble falling or staying asleep, or sleeping too much: Not at all  Feeling tired or having little energy: More than half the days  Poor appetite or overeating: Not at all  Feeling bad about yourself - or that you are a failure or have let yourself or your family down: Not at all  Trouble concentrating on things, such as reading the newspaper or watching television: More than half the days  Moving or speaking so slowly that other people could have noticed. Or the opposite - being so fidgety or restless that you have been moving around a lot more than usual: Not at all  Thoughts that you would be better off dead, or of hurting yourself in some way: Several days  If you checked off any problems, how difficult have these problems made it for you to do your work, take care of things at home, or get along with other people?: Somewhat difficult  PHQ-9 Total Score: 7  PHQ-9 Total Score: 7  Assessment & Plan   DIAGNOSIS AND ASSESSMENT DATA     DIAGNOSIS:   1. PTSD (post-traumatic stress disorder)    2. Generalized anxiety disorder    3. Persistent depressive disorder        R/O Personality Disorder    PLAN   Follow-up:  Return in about 4 weeks (around 5/28/2024), or if symptoms worsen or fail to improve, for follow-up and medication management.    Prescriptions for this encounter:  New Prescriptions    No medications on file       Orders Placed This Encounter   Medications    busPIRone (BUSPAR) 15 MG tablet     Sig: Take 15 mg by mouth in the morning and at bedtime     Dispense:  60 tablet     Refill:  1    DULoxetine (CYMBALTA) 30 MG extended release capsule     Sig: Take 3 capsules by mouth daily     Dispense:  90 capsule     Refill:  1       Medications Discontinued During This Encounter   Medication Reason    propranolol (INDERAL LA) 60 MG extended release capsule DISCONTINUED BY ANOTHER CLINICIAN    busPIRone (BUSPAR) 15 MG tablet